# Patient Record
Sex: MALE | Race: WHITE | NOT HISPANIC OR LATINO | Employment: UNEMPLOYED | URBAN - METROPOLITAN AREA
[De-identification: names, ages, dates, MRNs, and addresses within clinical notes are randomized per-mention and may not be internally consistent; named-entity substitution may affect disease eponyms.]

---

## 2023-03-14 DIAGNOSIS — E78.5 HYPERLIPIDEMIA, UNSPECIFIED HYPERLIPIDEMIA TYPE: Primary | ICD-10-CM

## 2023-03-14 RX ORDER — ROSUVASTATIN CALCIUM 10 MG/1
10 TABLET, COATED ORAL DAILY
Qty: 90 TABLET | Refills: 0 | Status: SHIPPED | OUTPATIENT
Start: 2023-03-14 | End: 2023-06-02 | Stop reason: SDUPTHER

## 2023-03-14 RX ORDER — CYCLOBENZAPRINE HCL 10 MG
1 TABLET ORAL NIGHTLY
COMMUNITY
Start: 2022-07-09 | End: 2023-12-28 | Stop reason: WASHOUT

## 2023-03-14 RX ORDER — NABUMETONE 750 MG/1
1 TABLET, FILM COATED ORAL EVERY 12 HOURS
COMMUNITY
Start: 2022-07-09 | End: 2023-06-09 | Stop reason: SDUPTHER

## 2023-03-14 RX ORDER — IBUPROFEN 600 MG/1
1 TABLET ORAL 3 TIMES DAILY PRN
COMMUNITY
Start: 2022-07-09

## 2023-03-14 RX ORDER — ASPIRIN 81 MG/1
TABLET ORAL
COMMUNITY
End: 2023-12-28 | Stop reason: WASHOUT

## 2023-03-14 RX ORDER — ROSUVASTATIN CALCIUM 10 MG/1
1 TABLET, COATED ORAL DAILY
COMMUNITY
Start: 2013-09-17 | End: 2023-03-14 | Stop reason: SDUPTHER

## 2023-03-14 RX ORDER — METOPROLOL SUCCINATE 50 MG/1
1 TABLET, EXTENDED RELEASE ORAL DAILY
COMMUNITY
Start: 2021-02-23

## 2023-03-14 RX ORDER — ESCITALOPRAM OXALATE 20 MG/1
1 TABLET ORAL DAILY
COMMUNITY
Start: 2020-04-23 | End: 2023-03-27

## 2023-03-27 DIAGNOSIS — F32.9 MAJOR DEPRESSIVE DISORDER, SINGLE EPISODE, UNSPECIFIED: ICD-10-CM

## 2023-03-27 RX ORDER — ESCITALOPRAM OXALATE 20 MG/1
TABLET ORAL
Qty: 90 TABLET | Refills: 1 | Status: SHIPPED | OUTPATIENT
Start: 2023-03-27 | End: 2023-04-26 | Stop reason: SDUPTHER

## 2023-04-21 DIAGNOSIS — I10 ESSENTIAL (PRIMARY) HYPERTENSION: ICD-10-CM

## 2023-04-21 RX ORDER — METOPROLOL SUCCINATE 50 MG/1
TABLET, EXTENDED RELEASE ORAL
Qty: 90 TABLET | OUTPATIENT
Start: 2023-04-21

## 2023-04-26 DIAGNOSIS — F32.0 CURRENT MILD EPISODE OF MAJOR DEPRESSIVE DISORDER WITHOUT PRIOR EPISODE (CMS-HCC): Primary | ICD-10-CM

## 2023-04-26 DIAGNOSIS — F32.9 MAJOR DEPRESSIVE DISORDER, SINGLE EPISODE, UNSPECIFIED: ICD-10-CM

## 2023-04-26 RX ORDER — ESCITALOPRAM OXALATE 20 MG/1
20 TABLET ORAL DAILY
Qty: 30 TABLET | Refills: 0 | Status: SHIPPED | OUTPATIENT
Start: 2023-04-26 | End: 2023-05-03 | Stop reason: SDUPTHER

## 2023-05-03 ENCOUNTER — OFFICE VISIT (OUTPATIENT)
Dept: PRIMARY CARE | Facility: CLINIC | Age: 73
End: 2023-05-03
Payer: MEDICARE

## 2023-05-03 VITALS
WEIGHT: 199 LBS | DIASTOLIC BLOOD PRESSURE: 88 MMHG | SYSTOLIC BLOOD PRESSURE: 146 MMHG | BODY MASS INDEX: 29.47 KG/M2 | HEIGHT: 69 IN

## 2023-05-03 DIAGNOSIS — F32.9 MAJOR DEPRESSIVE DISORDER, SINGLE EPISODE, UNSPECIFIED: ICD-10-CM

## 2023-05-03 DIAGNOSIS — E78.00 LOW-DENSITY-LIPOID-TYPE (LDL) HYPERLIPOPROTEINEMIA: ICD-10-CM

## 2023-05-03 DIAGNOSIS — I10 PRIMARY HYPERTENSION: ICD-10-CM

## 2023-05-03 DIAGNOSIS — F20.89 OTHER SCHIZOPHRENIA (MULTI): ICD-10-CM

## 2023-05-03 DIAGNOSIS — F32.0 CURRENT MILD EPISODE OF MAJOR DEPRESSIVE DISORDER WITHOUT PRIOR EPISODE (CMS-HCC): ICD-10-CM

## 2023-05-03 PROBLEM — I95.1 ORTHOSTATIC HYPOTENSION: Status: ACTIVE | Noted: 2023-05-03

## 2023-05-03 PROBLEM — H02.889 MEIBOMIAN GLAND DYSFUNCTION (MGD): Status: ACTIVE | Noted: 2023-05-03

## 2023-05-03 PROBLEM — R41.3 MEMORY LOSS: Status: ACTIVE | Noted: 2023-05-03

## 2023-05-03 PROBLEM — H11.829 CONJUNCTIVAL CHALASIS: Status: ACTIVE | Noted: 2023-05-03

## 2023-05-03 PROBLEM — R42 DIZZINESS: Status: ACTIVE | Noted: 2023-05-03

## 2023-05-03 PROBLEM — H04.123 DRY EYE SYNDROME OF BOTH LACRIMAL GLANDS: Status: ACTIVE | Noted: 2023-05-03

## 2023-05-03 PROBLEM — H02.836 DERMATOCHALASIS OF EYELID OF LEFT EYE: Status: ACTIVE | Noted: 2023-05-03

## 2023-05-03 PROBLEM — H10.13 ACUTE ALLERGIC CONJUNCTIVITIS OF BOTH EYES: Status: ACTIVE | Noted: 2023-05-03

## 2023-05-03 PROBLEM — M54.2 NECK PAIN: Status: ACTIVE | Noted: 2023-05-03

## 2023-05-03 PROBLEM — R09.89 CAROTID BRUIT: Status: ACTIVE | Noted: 2023-05-03

## 2023-05-03 PROBLEM — G31.84 MINIMAL COGNITIVE IMPAIRMENT: Status: ACTIVE | Noted: 2023-05-03

## 2023-05-03 PROBLEM — F32.A DEPRESSION: Status: ACTIVE | Noted: 2023-05-03

## 2023-05-03 PROBLEM — H81.09 MENIERE'S DISEASE: Status: ACTIVE | Noted: 2023-05-03

## 2023-05-03 PROBLEM — J01.90 ACUTE SINUSITIS: Status: ACTIVE | Noted: 2023-05-03

## 2023-05-03 PROBLEM — M48.00 SPINAL STENOSIS: Status: ACTIVE | Noted: 2023-05-03

## 2023-05-03 PROCEDURE — 1159F MED LIST DOCD IN RCRD: CPT | Performed by: INTERNAL MEDICINE

## 2023-05-03 PROCEDURE — 3079F DIAST BP 80-89 MM HG: CPT | Performed by: INTERNAL MEDICINE

## 2023-05-03 PROCEDURE — 99214 OFFICE O/P EST MOD 30 MIN: CPT | Performed by: INTERNAL MEDICINE

## 2023-05-03 PROCEDURE — 3077F SYST BP >= 140 MM HG: CPT | Performed by: INTERNAL MEDICINE

## 2023-05-03 RX ORDER — HALOPERIDOL 0.5 MG/1
0.5 TABLET ORAL 2 TIMES DAILY
Qty: 60 TABLET | Refills: 0 | Status: SHIPPED | OUTPATIENT
Start: 2023-05-03 | End: 2023-06-02 | Stop reason: SDUPTHER

## 2023-05-03 RX ORDER — ESCITALOPRAM OXALATE 20 MG/1
20 TABLET ORAL DAILY
Qty: 90 TABLET | Refills: 1 | Status: SHIPPED | OUTPATIENT
Start: 2023-05-03 | End: 2023-10-31

## 2023-05-03 NOTE — PROGRESS NOTES
"OFFICE NOTE    NAME OF THE PATIENT: Mio Tirado    YOB: 1950    CHIEF COMPLAINT:  Mio Tirado today came here for multiple medical issues.  He told me that he has sundowning.  He wakes up at night.  It looks like the commentators are speaking, joking around and he hears these voices, they do not exist.  He saw ENT doctor.  ENT doctor says he is hallucinating.  He is at last to find why he is hallucinating.  Appetite and weight are okay.  No problem.    PAST MEDICAL HISTORY:  Reviewed on EMR, unchanged.    CURRENT MEDICATIONS:  Reviewed on EMR, unchanged.  List reviewed.    ALLERGIES:  Reviewed on EMR, unchanged.    SOCIAL HISTORY:  Reviewed on EMR, unchanged.  He does not smoke, does not drink alcohol.    FAMILY HISTORY:  Reviewed on EMR, unchanged.    REVIEW OF SYSTEMS:  All 12 systems reviewed and pertaining covered in history and physical.    PHYSICAL EXAMINATION  VITAL SIGNS:  As recorded and reviewed from EMR.  RESPIRATORY:  The patient had normal inspirations and expirations.  The breath sounds were equal bilaterally and clear to auscultation.  CARDIOVASCULAR:  The patient had S1 normal, split S2 without obvious rubs, clicks, or murmurs.    GASTROINTESTINAL:  There was no hepatosplenomegaly.  There were no palpable masses and no inguinal nodes.  EXTREMITIES:  Legs had no edema.  NEUROLOGIC:  The patient had normal cranial nerves.  The reflexes, sensory, and motor examination were grossly within normal limits.    LAB WORK:  Laboratory testing discussed.    ASSESSMENT AND PLAN:  Hypertension, okay.  Cholesterol.  Monitor.  Anxiety and stress.  Lexapro given.  Blood work ordered.  Sundowning.  I am going to try some Haldol.  I shall see him back in about two week's time.    Kindly review this note in conjunction with EMR.     Subjective   Patient ID: Mio Tirado is a 72 y.o. male who presents for Follow-up.      HPI    Review of Systems    Objective   /88   Ht 1.753 m (5' 9\")   Wt " 90.3 kg (199 lb)   BMI 29.39 kg/m²       Physical Exam    Assessment/Plan   Problem List Items Addressed This Visit          Other    Depression     Other Visit Diagnoses       Major depressive disorder, single episode, unspecified

## 2023-05-11 ENCOUNTER — TELEPHONE (OUTPATIENT)
Dept: PRIMARY CARE | Facility: CLINIC | Age: 73
End: 2023-05-11
Payer: MEDICARE

## 2023-05-11 NOTE — TELEPHONE ENCOUNTER
----- Message from Ly Vann MA sent at 5/10/2023  4:52 PM EDT -----  Pt lm wanted lexapro called into pharmacy but pt states pharmacy will not fill it until the end of may and pt sts he needs it now bc he is out

## 2023-06-02 DIAGNOSIS — I10 PRIMARY HYPERTENSION: ICD-10-CM

## 2023-06-02 DIAGNOSIS — M54.9 BACK PAIN, UNSPECIFIED BACK LOCATION, UNSPECIFIED BACK PAIN LATERALITY, UNSPECIFIED CHRONICITY: ICD-10-CM

## 2023-06-02 DIAGNOSIS — F20.89 OTHER SCHIZOPHRENIA (MULTI): ICD-10-CM

## 2023-06-02 DIAGNOSIS — E78.5 HYPERLIPIDEMIA, UNSPECIFIED HYPERLIPIDEMIA TYPE: ICD-10-CM

## 2023-06-02 PROBLEM — H02.833 DERMATOCHALASIS OF EYELID OF RIGHT EYE: Status: ACTIVE | Noted: 2023-06-02

## 2023-06-02 PROBLEM — H10.13 ACUTE ALLERGIC CONJUNCTIVITIS, BILATERAL: Status: ACTIVE | Noted: 2023-06-02

## 2023-06-02 RX ORDER — HALOPERIDOL 0.5 MG/1
0.5 TABLET ORAL 2 TIMES DAILY
Qty: 180 TABLET | Refills: 1 | Status: SHIPPED | OUTPATIENT
Start: 2023-06-02 | End: 2023-07-06 | Stop reason: ALTCHOICE

## 2023-06-02 RX ORDER — ROSUVASTATIN CALCIUM 10 MG/1
10 TABLET, COATED ORAL DAILY
Qty: 90 TABLET | Refills: 0 | Status: SHIPPED | OUTPATIENT
Start: 2023-06-02 | End: 2023-08-24

## 2023-06-08 ENCOUNTER — OFFICE VISIT (OUTPATIENT)
Dept: PRIMARY CARE | Facility: CLINIC | Age: 73
End: 2023-06-08
Payer: MEDICARE

## 2023-06-08 ENCOUNTER — LAB (OUTPATIENT)
Dept: LAB | Facility: LAB | Age: 73
End: 2023-06-08
Payer: MEDICARE

## 2023-06-08 VITALS
BODY MASS INDEX: 29.03 KG/M2 | DIASTOLIC BLOOD PRESSURE: 78 MMHG | SYSTOLIC BLOOD PRESSURE: 126 MMHG | HEIGHT: 69 IN | WEIGHT: 196 LBS

## 2023-06-08 DIAGNOSIS — E55.9 VITAMIN D DEFICIENCY: ICD-10-CM

## 2023-06-08 DIAGNOSIS — E78.00 LOW-DENSITY-LIPOID-TYPE (LDL) HYPERLIPOPROTEINEMIA: ICD-10-CM

## 2023-06-08 DIAGNOSIS — F23 BRIEF PSYCHOTIC DISORDER (MULTI): ICD-10-CM

## 2023-06-08 DIAGNOSIS — I10 HYPERTENSION, UNSPECIFIED TYPE: ICD-10-CM

## 2023-06-08 DIAGNOSIS — E53.9 VITAMIN B DEFICIENCY: ICD-10-CM

## 2023-06-08 LAB
APPEARANCE, URINE: NORMAL
BILIRUBIN, URINE: NEGATIVE
BLOOD, URINE: NEGATIVE
CALCIDIOL (25 OH VITAMIN D3) (NG/ML) IN SER/PLAS: 41 NG/ML
COBALAMIN (VITAMIN B12) (PG/ML) IN SER/PLAS: 603 PG/ML (ref 211–911)
COLOR, URINE: YELLOW
GLUCOSE, URINE: NEGATIVE MG/DL
KETONES, URINE: NEGATIVE MG/DL
LEUKOCYTE ESTERASE, URINE: NEGATIVE
NITRITE, URINE: NEGATIVE
PH, URINE: 5 (ref 5–8)
PROTEIN, URINE: NEGATIVE MG/DL
SPECIFIC GRAVITY, URINE: 1.02 (ref 1–1.03)
THYROTROPIN (MIU/L) IN SER/PLAS BY DETECTION LIMIT <= 0.05 MIU/L: 1.56 MIU/L (ref 0.44–3.98)
UROBILINOGEN, URINE: <2 MG/DL (ref 0–1.9)

## 2023-06-08 PROCEDURE — 82607 VITAMIN B-12: CPT

## 2023-06-08 PROCEDURE — 3074F SYST BP LT 130 MM HG: CPT | Performed by: INTERNAL MEDICINE

## 2023-06-08 PROCEDURE — 85025 COMPLETE CBC W/AUTO DIFF WBC: CPT

## 2023-06-08 PROCEDURE — 80053 COMPREHEN METABOLIC PANEL: CPT

## 2023-06-08 PROCEDURE — 81003 URINALYSIS AUTO W/O SCOPE: CPT

## 2023-06-08 PROCEDURE — 84443 ASSAY THYROID STIM HORMONE: CPT

## 2023-06-08 PROCEDURE — 1159F MED LIST DOCD IN RCRD: CPT | Performed by: INTERNAL MEDICINE

## 2023-06-08 PROCEDURE — 36415 COLL VENOUS BLD VENIPUNCTURE: CPT

## 2023-06-08 PROCEDURE — 99213 OFFICE O/P EST LOW 20 MIN: CPT | Performed by: INTERNAL MEDICINE

## 2023-06-08 PROCEDURE — 82306 VITAMIN D 25 HYDROXY: CPT

## 2023-06-08 PROCEDURE — 3078F DIAST BP <80 MM HG: CPT | Performed by: INTERNAL MEDICINE

## 2023-06-08 PROCEDURE — 80061 LIPID PANEL: CPT

## 2023-06-08 RX ORDER — HALOPERIDOL 0.5 MG/1
0.5 TABLET ORAL 2 TIMES DAILY
Qty: 180 TABLET | Refills: 1 | Status: SHIPPED | OUTPATIENT
Start: 2023-06-08 | End: 2024-03-21

## 2023-06-08 NOTE — PROGRESS NOTES
"OFFICE NOTE    NAME OF THE PATIENT: Mio Tirado    YOB: 1950    CHIEF COMPLAINT:  Mr. Tirado today came here for multiple medical issues.  He is feeling somewhat tired, fatigued, exhausted all the time.  The Haldol did help his hallucination.  Appetite and weight are okay.  No problem.    PAST MEDICAL HISTORY:  Reviewed on EMR, unchanged.    CURRENT MEDICATIONS:  Reviewed on EMR, unchanged.  List reviewed.    ALLERGIES:  Reviewed on EMR, unchanged.    SOCIAL HISTORY:  Reviewed on EMR, unchanged.  He does not smoke.    FAMILY HISTORY:  Reviewed on EMR, unchanged.    REVIEW OF SYSTEMS:  All 12 systems reviewed and pertaining covered in history and physical.    PHYSICAL EXAMINATION  VITAL SIGNS:  As recorded and reviewed from EMR.  RESPIRATORY:  The patient had normal inspirations and expirations.  The breath sounds were equal bilaterally and clear to auscultation.  CARDIOVASCULAR:  The patient had S1 normal, split S2 without obvious rubs, clicks, or murmurs.    GASTROINTESTINAL:  There was no hepatosplenomegaly.  There were no palpable masses and no inguinal nodes.  EXTREMITIES:  Legs had no edema.  NEUROLOGIC:  The patient had normal cranial nerves.  The reflexes, sensory, and motor examination were grossly within normal limits.    LAB WORK:  Laboratory testing discussed.    ASSESSMENT AND PLAN:  Psychosis and sundowning issue.  We will give Haldol as needed.  Anxiety and stress.  I am going to move Lexapro to evening and see what happens.  Hypertension, okay.  High cholesterol, stable.  Thyroid, okay.  Sleep apnea.  He is using machine.  He is happy.  Follow-up appointment with me in about three weeks to review situation.  I urged him to do the blood work.    Kindly review this note in conjunction with EMR.     Subjective   Patient ID: Mio Tirado is a 73 y.o. male who presents for Follow-up.      HPI    Review of Systems    Objective   /78   Ht 1.753 m (5' 9\")   Wt 88.9 kg (196 lb)  "  BMI 28.94 kg/m²       Physical Exam    Assessment/Plan   Problem List Items Addressed This Visit    None

## 2023-06-09 LAB
ALANINE AMINOTRANSFERASE (SGPT) (U/L) IN SER/PLAS: 14 U/L (ref 10–52)
ALBUMIN (G/DL) IN SER/PLAS: 4.3 G/DL (ref 3.4–5)
ALKALINE PHOSPHATASE (U/L) IN SER/PLAS: 47 U/L (ref 33–136)
ANION GAP IN SER/PLAS: 15 MMOL/L (ref 10–20)
ASPARTATE AMINOTRANSFERASE (SGOT) (U/L) IN SER/PLAS: 14 U/L (ref 9–39)
BASOPHILS (10*3/UL) IN BLOOD BY AUTOMATED COUNT: 0.12 X10E9/L (ref 0–0.1)
BASOPHILS/100 LEUKOCYTES IN BLOOD BY AUTOMATED COUNT: 1.5 % (ref 0–2)
BILIRUBIN TOTAL (MG/DL) IN SER/PLAS: 1.2 MG/DL (ref 0–1.2)
CALCIUM (MG/DL) IN SER/PLAS: 10.1 MG/DL (ref 8.6–10.6)
CARBON DIOXIDE, TOTAL (MMOL/L) IN SER/PLAS: 27 MMOL/L (ref 21–32)
CHLORIDE (MMOL/L) IN SER/PLAS: 103 MMOL/L (ref 98–107)
CHOLESTEROL (MG/DL) IN SER/PLAS: 184 MG/DL (ref 0–199)
CHOLESTEROL IN HDL (MG/DL) IN SER/PLAS: 44.9 MG/DL
CHOLESTEROL/HDL RATIO: 4.1
CREATININE (MG/DL) IN SER/PLAS: 0.99 MG/DL (ref 0.5–1.3)
EOSINOPHILS (10*3/UL) IN BLOOD BY AUTOMATED COUNT: 0.78 X10E9/L (ref 0–0.4)
EOSINOPHILS/100 LEUKOCYTES IN BLOOD BY AUTOMATED COUNT: 9.4 % (ref 0–6)
ERYTHROCYTE DISTRIBUTION WIDTH (RATIO) BY AUTOMATED COUNT: 12.2 % (ref 11.5–14.5)
ERYTHROCYTE MEAN CORPUSCULAR HEMOGLOBIN CONCENTRATION (G/DL) BY AUTOMATED: 33.1 G/DL (ref 32–36)
ERYTHROCYTE MEAN CORPUSCULAR VOLUME (FL) BY AUTOMATED COUNT: 92 FL (ref 80–100)
ERYTHROCYTES (10*6/UL) IN BLOOD BY AUTOMATED COUNT: 5.54 X10E12/L (ref 4.5–5.9)
GFR MALE: 79 ML/MIN/1.73M2
GLUCOSE (MG/DL) IN SER/PLAS: 93 MG/DL (ref 74–99)
HEMATOCRIT (%) IN BLOOD BY AUTOMATED COUNT: 51.1 % (ref 41–52)
HEMOGLOBIN (G/DL) IN BLOOD: 16.9 G/DL (ref 13.5–17.5)
IMMATURE GRANULOCYTES/100 LEUKOCYTES IN BLOOD BY AUTOMATED COUNT: 0.2 % (ref 0–0.9)
LDL: 88 MG/DL (ref 0–99)
LEUKOCYTES (10*3/UL) IN BLOOD BY AUTOMATED COUNT: 8.3 X10E9/L (ref 4.4–11.3)
LYMPHOCYTES (10*3/UL) IN BLOOD BY AUTOMATED COUNT: 2.01 X10E9/L (ref 0.8–3)
LYMPHOCYTES/100 LEUKOCYTES IN BLOOD BY AUTOMATED COUNT: 24.3 % (ref 13–44)
MONOCYTES (10*3/UL) IN BLOOD BY AUTOMATED COUNT: 0.55 X10E9/L (ref 0.05–0.8)
MONOCYTES/100 LEUKOCYTES IN BLOOD BY AUTOMATED COUNT: 6.7 % (ref 2–10)
NEUTROPHILS (10*3/UL) IN BLOOD BY AUTOMATED COUNT: 4.78 X10E9/L (ref 1.6–5.5)
NEUTROPHILS/100 LEUKOCYTES IN BLOOD BY AUTOMATED COUNT: 57.9 % (ref 40–80)
NON HDL CHOLESTEROL: 139 MG/DL
NRBC (PER 100 WBCS) BY AUTOMATED COUNT: 0 /100 WBC (ref 0–0)
PLATELETS (10*3/UL) IN BLOOD AUTOMATED COUNT: 216 X10E9/L (ref 150–450)
POTASSIUM (MMOL/L) IN SER/PLAS: 3.7 MMOL/L (ref 3.5–5.3)
PROTEIN TOTAL: 6.4 G/DL (ref 6.4–8.2)
SODIUM (MMOL/L) IN SER/PLAS: 141 MMOL/L (ref 136–145)
TRIGLYCERIDE (MG/DL) IN SER/PLAS: 255 MG/DL (ref 0–149)
UREA NITROGEN (MG/DL) IN SER/PLAS: 19 MG/DL (ref 6–23)
VLDL: 51 MG/DL (ref 0–40)

## 2023-06-09 RX ORDER — NABUMETONE 750 MG/1
750 TABLET, FILM COATED ORAL EVERY 12 HOURS
Qty: 60 TABLET | Refills: 3 | Status: SHIPPED | OUTPATIENT
Start: 2023-06-09 | End: 2023-10-31

## 2023-06-15 ENCOUNTER — TELEPHONE (OUTPATIENT)
Dept: PRIMARY CARE | Facility: CLINIC | Age: 73
End: 2023-06-15
Payer: MEDICARE

## 2023-06-21 DIAGNOSIS — E78.5 HYPERLIPIDEMIA, UNSPECIFIED HYPERLIPIDEMIA TYPE: ICD-10-CM

## 2023-07-06 ENCOUNTER — OFFICE VISIT (OUTPATIENT)
Dept: PRIMARY CARE | Facility: CLINIC | Age: 73
End: 2023-07-06
Payer: MEDICARE

## 2023-07-06 VITALS — WEIGHT: 199 LBS | HEIGHT: 69 IN | BODY MASS INDEX: 29.47 KG/M2

## 2023-07-06 DIAGNOSIS — I10 PRIMARY HYPERTENSION: Primary | ICD-10-CM

## 2023-07-06 PROCEDURE — 1159F MED LIST DOCD IN RCRD: CPT | Performed by: INTERNAL MEDICINE

## 2023-07-06 PROCEDURE — 99213 OFFICE O/P EST LOW 20 MIN: CPT | Performed by: INTERNAL MEDICINE

## 2023-07-06 NOTE — PROGRESS NOTES
"OFFICE NOTE    NAME OF THE PATIENT: Mio Tirado    YOB: 1950    CHIEF COMPLAINT:  This gentleman today came here for multiple medical issues.  Haldol does help him, but not completely.  He is hearing voices.  Feeling tired, fatigued, exhausted.  He came for follow-up on blood work.  Appetite and weight are okay.  No problem.    PAST MEDICAL HISTORY:  Reviewed on EMR, unchanged.    CURRENT MEDICATIONS:  Reviewed on EMR, unchanged.    ALLERGIES:  Reviewed on EMR, unchanged.    SOCIAL HISTORY:  Reviewed on EMR, unchanged.    FAMILY HISTORY:  Reviewed on EMR, unchanged.    REVIEW OF SYSTEMS:  All 12 systems reviewed and pertaining covered in history and physical.    PHYSICAL EXAMINATION  VITAL SIGNS:  As recorded and reviewed from EMR.  RESPIRATORY:  The patient had normal inspirations and expirations.  The breath sounds were equal bilaterally and clear to auscultation.  CARDIOVASCULAR:  The patient had S1 normal, split S2 without obvious rubs, clicks, or murmurs.    GASTROINTESTINAL:  There was no hepatosplenomegaly.  There were no palpable masses and no inguinal nodes.  EXTREMITIES:  Legs had no edema.  NEUROLOGIC:  The patient had normal cranial nerves.  The reflexes, sensory, and motor examination were grossly within normal limits.    LAB WORK:  Laboratory testing discussed.    ASSESSMENT AND PLAN:  Hypertension, okay.  Cholesterol, stable.  GERD, on PPI.  Anxiety, stress, sundowning.  Haldol is not completely helping.  We discussed Rexulti.  We will start with low dose.  See him back in three weeks.  Continue to follow.  Vitamin D improved.  Monitor.    Kindly review this note in conjunction with EMR.   Subjective   Patient ID: Mio Tirado is a 73 y.o. male who presents for Follow-up and Med Refill.      HPI    Review of Systems    Objective   Ht 1.753 m (5' 9\")   Wt 90.3 kg (199 lb)   BMI 29.39 kg/m²       Physical Exam    Assessment/Plan   Problem List Items Addressed This Visit  "   None

## 2023-07-20 ENCOUNTER — OFFICE VISIT (OUTPATIENT)
Dept: PRIMARY CARE | Facility: CLINIC | Age: 73
End: 2023-07-20
Payer: MEDICARE

## 2023-07-20 ENCOUNTER — LAB (OUTPATIENT)
Dept: LAB | Facility: LAB | Age: 73
End: 2023-07-20
Payer: MEDICARE

## 2023-07-20 VITALS
DIASTOLIC BLOOD PRESSURE: 88 MMHG | WEIGHT: 198 LBS | SYSTOLIC BLOOD PRESSURE: 144 MMHG | HEIGHT: 69 IN | BODY MASS INDEX: 29.33 KG/M2

## 2023-07-20 DIAGNOSIS — R07.9 CHEST PAIN, UNSPECIFIED TYPE: ICD-10-CM

## 2023-07-20 DIAGNOSIS — K82.9 GALLBLADDER PAIN: ICD-10-CM

## 2023-07-20 LAB — TROPONIN I, HIGH SENSITIVITY: 47 NG/L (ref 0–53)

## 2023-07-20 PROCEDURE — 93000 ELECTROCARDIOGRAM COMPLETE: CPT | Performed by: INTERNAL MEDICINE

## 2023-07-20 PROCEDURE — 1159F MED LIST DOCD IN RCRD: CPT | Performed by: INTERNAL MEDICINE

## 2023-07-20 PROCEDURE — 99214 OFFICE O/P EST MOD 30 MIN: CPT | Performed by: INTERNAL MEDICINE

## 2023-07-20 PROCEDURE — 3079F DIAST BP 80-89 MM HG: CPT | Performed by: INTERNAL MEDICINE

## 2023-07-20 PROCEDURE — 3077F SYST BP >= 140 MM HG: CPT | Performed by: INTERNAL MEDICINE

## 2023-07-20 PROCEDURE — 84484 ASSAY OF TROPONIN QUANT: CPT

## 2023-07-20 PROCEDURE — 36415 COLL VENOUS BLD VENIPUNCTURE: CPT

## 2023-07-20 RX ORDER — OMEPRAZOLE 40 MG/1
40 CAPSULE, DELAYED RELEASE ORAL
Qty: 30 CAPSULE | Refills: 3 | Status: SHIPPED | OUTPATIENT
Start: 2023-07-20 | End: 2023-08-15

## 2023-07-20 NOTE — PROGRESS NOTES
"OFFICE NOTE    NAME OF THE PATIENT: Mio Tirado    YOB: 1950    CHIEF COMPLAINT:  Mio Tirado today came here for multiple medical issues.  He has chest pain, dyspnea on exertion, not feeling well.  Easy fatigability.  He came here for follow-up on various conditions.  It happened all suddenly yesterday.  He had a stress test done in 2020.    PAST MEDICAL HISTORY:  Reviewed on EMR, unchanged.    CURRENT MEDICATIONS:  Reviewed on EMR, unchanged.    ALLERGIES:  Reviewed on EMR, unchanged.    SOCIAL HISTORY:  Reviewed on EMR, unchanged.    FAMILY HISTORY:  Reviewed on EMR, unchanged.    REVIEW OF SYSTEMS:  All 12 systems reviewed and pertaining covered in history and physical.    PHYSICAL EXAMINATION  VITAL SIGNS:  As recorded and reviewed from EMR.  RESPIRATORY:  The patient had normal inspirations and expirations.  The breath sounds were equal bilaterally and clear to auscultation.  CARDIOVASCULAR:  The patient had S1 normal, split S2 without obvious rubs, clicks, or murmurs.    GASTROINTESTINAL:  There was no hepatosplenomegaly.  There were no palpable masses and no inguinal nodes.  EXTREMITIES:  Legs had no edema.  NEUROLOGIC:  The patient had normal cranial nerves.  The reflexes, sensory, and motor examination were grossly within normal limits.    LAB WORK:  Laboratory testing discussed.    ASSESSMENT AND PLAN:  Chest pain rule out MI.  EKG done, comparable non-specific.  Chest x-ray ______ report pending.  Gall bladder ultrasound, probably gallbladder issue.  I ordered ultrasound.  GERD.  PPI given.  Cardiac.  Refer to Dr. Montes.  I saw him before and after x-ray and EKG.  Blood work reviewed.  I shall see him back in four weeks.  Continue to follow.      Kindly review this note in conjunction with EMR.   Subjective   Patient ID: Adithya Tirado is a 73 y.o. male who presents for Follow-up.      HPI    Review of Systems    Objective   /88   Ht 1.753 m (5' 9\")   Wt 89.8 kg (198 lb)   " BMI 29.24 kg/m²       Physical Exam    Assessment/Plan   Problem List Items Addressed This Visit    None

## 2023-07-26 ENCOUNTER — TELEPHONE (OUTPATIENT)
Dept: PRIMARY CARE | Facility: CLINIC | Age: 73
End: 2023-07-26
Payer: MEDICARE

## 2023-08-15 DIAGNOSIS — R07.9 CHEST PAIN, UNSPECIFIED TYPE: ICD-10-CM

## 2023-08-15 RX ORDER — OMEPRAZOLE 40 MG/1
40 CAPSULE, DELAYED RELEASE ORAL
Qty: 30 CAPSULE | Refills: 3 | Status: SHIPPED | OUTPATIENT
Start: 2023-08-15 | End: 2023-11-13

## 2023-08-24 DIAGNOSIS — E78.5 HYPERLIPIDEMIA, UNSPECIFIED HYPERLIPIDEMIA TYPE: ICD-10-CM

## 2023-08-24 RX ORDER — ROSUVASTATIN CALCIUM 10 MG/1
10 TABLET, COATED ORAL DAILY
Qty: 90 TABLET | Refills: 0 | Status: SHIPPED | OUTPATIENT
Start: 2023-08-24

## 2023-10-31 DIAGNOSIS — H10.13 ACUTE ATOPIC CONJUNCTIVITIS, BILATERAL: ICD-10-CM

## 2023-10-31 RX ORDER — FLUOROMETHOLONE 1 MG/ML
SUSPENSION/ DROPS OPHTHALMIC
Qty: 5 ML | Refills: 3 | Status: SHIPPED | OUTPATIENT
Start: 2023-10-31 | End: 2023-11-01

## 2023-11-13 DIAGNOSIS — R07.9 CHEST PAIN, UNSPECIFIED TYPE: ICD-10-CM

## 2023-11-13 RX ORDER — OMEPRAZOLE 40 MG/1
40 CAPSULE, DELAYED RELEASE ORAL
Qty: 90 CAPSULE | Refills: 1 | Status: SHIPPED | OUTPATIENT
Start: 2023-11-13 | End: 2024-05-10

## 2023-12-04 ENCOUNTER — OFFICE VISIT (OUTPATIENT)
Dept: PRIMARY CARE | Facility: CLINIC | Age: 73
End: 2023-12-04
Payer: MEDICARE

## 2023-12-04 VITALS
WEIGHT: 200 LBS | BODY MASS INDEX: 29.62 KG/M2 | HEIGHT: 69 IN | SYSTOLIC BLOOD PRESSURE: 128 MMHG | DIASTOLIC BLOOD PRESSURE: 78 MMHG

## 2023-12-04 DIAGNOSIS — E78.00 LOW-DENSITY-LIPOID-TYPE (LDL) HYPERLIPOPROTEINEMIA: ICD-10-CM

## 2023-12-04 DIAGNOSIS — Z12.5 ENCOUNTER FOR PROSTATE CANCER SCREENING: ICD-10-CM

## 2023-12-04 DIAGNOSIS — I10 HYPERTENSION, UNSPECIFIED TYPE: ICD-10-CM

## 2023-12-04 DIAGNOSIS — M19.90 ARTHRITIS: Primary | ICD-10-CM

## 2023-12-04 DIAGNOSIS — E03.9 HYPOTHYROIDISM, UNSPECIFIED TYPE: ICD-10-CM

## 2023-12-04 DIAGNOSIS — M54.9 BACK PAIN, UNSPECIFIED BACK LOCATION, UNSPECIFIED BACK PAIN LATERALITY, UNSPECIFIED CHRONICITY: ICD-10-CM

## 2023-12-04 PROCEDURE — 1126F AMNT PAIN NOTED NONE PRSNT: CPT | Performed by: INTERNAL MEDICINE

## 2023-12-04 PROCEDURE — 1159F MED LIST DOCD IN RCRD: CPT | Performed by: INTERNAL MEDICINE

## 2023-12-04 PROCEDURE — 3078F DIAST BP <80 MM HG: CPT | Performed by: INTERNAL MEDICINE

## 2023-12-04 PROCEDURE — 99213 OFFICE O/P EST LOW 20 MIN: CPT | Performed by: INTERNAL MEDICINE

## 2023-12-04 PROCEDURE — 3074F SYST BP LT 130 MM HG: CPT | Performed by: INTERNAL MEDICINE

## 2023-12-04 RX ORDER — NABUMETONE 750 MG/1
750 TABLET, FILM COATED ORAL EVERY 12 HOURS
Qty: 60 TABLET | Refills: 0 | Status: SHIPPED | OUTPATIENT
Start: 2023-12-04 | End: 2024-01-31 | Stop reason: SDUPTHER

## 2023-12-04 ASSESSMENT — ENCOUNTER SYMPTOMS
OCCASIONAL FEELINGS OF UNSTEADINESS: 0
DEPRESSION: 0
LOSS OF SENSATION IN FEET: 0

## 2023-12-05 ENCOUNTER — ANCILLARY PROCEDURE (OUTPATIENT)
Dept: RADIOLOGY | Facility: CLINIC | Age: 73
End: 2023-12-05
Payer: MEDICARE

## 2023-12-05 DIAGNOSIS — R07.9 CHEST PAIN, UNSPECIFIED: ICD-10-CM

## 2023-12-05 DIAGNOSIS — I10 ESSENTIAL (PRIMARY) HYPERTENSION: ICD-10-CM

## 2023-12-05 DIAGNOSIS — E78.00 PURE HYPERCHOLESTEROLEMIA, UNSPECIFIED: ICD-10-CM

## 2023-12-05 PROCEDURE — 75571 CT HRT W/O DYE W/CA TEST: CPT

## 2023-12-05 NOTE — PROGRESS NOTES
"Subjective   Patient ID: Adithya Tirado is a 73 y.o. male who presents for Follow-up (various conditions) and multiple medical issues.    Adithya Tirado today came here for multiple medical issues.  1. He has arthritis, bone and joint pain.  2. He told me that he has new sleep apnea machine.  It is working very well.  He is happy with that.  Appetite and weight are okay.  No problem.  He came for follow-up on various conditions.    I have personally reviewed the patient's Past Medical History, Medications, Allergies, Social History, and Family History in the EMR.    Review of Systems   All other systems reviewed and are negative.    Objective   /78   Ht 1.753 m (5' 9\")   Wt 90.7 kg (200 lb)   BMI 29.53 kg/m²     Physical Exam  Vitals reviewed.   Cardiovascular:      Heart sounds: Normal heart sounds, S1 normal and S2 normal. No murmur heard.     No friction rub.   Pulmonary:      Effort: Pulmonary effort is normal.      Breath sounds: Normal breath sounds and air entry.   Abdominal:      Palpations: There is no hepatomegaly, splenomegaly or mass.   Musculoskeletal:      Right lower leg: No edema.      Left lower leg: No edema.   Lymphadenopathy:      Lower Body: No right inguinal adenopathy. No left inguinal adenopathy.   Neurological:      Cranial Nerves: Cranial nerves 2-12 are intact.      Sensory: No sensory deficit.      Motor: Motor function is intact.      Deep Tendon Reflexes: Reflexes are normal and symmetric.     LAB WORK: Laboratory testing discussed.    Assessment/Plan   Problem List Items Addressed This Visit             ICD-10-CM       Cardiac and Vasculature    Hypertension I10    Relevant Orders    Thyroid Stimulating Hormone    Low-density-lipoid-type (LDL) hyperlipoproteinemia E78.00    Relevant Orders    Lipid Panel     Other Visit Diagnoses         Codes    Arthritis    -  Primary M19.90    Back pain, unspecified back location, unspecified back pain laterality, unspecified chronicity     " M54.9    Relevant Medications    nabumetone (Relafen) 750 mg tablet    Other Relevant Orders    CBC and Auto Differential    Comprehensive Metabolic Panel    Urinalysis with Reflex Microscopic    Encounter for prostate cancer screening     Z12.5    Relevant Orders    Prostate Specific Antigen    Hypothyroidism, unspecified type     E03.9        1. Arthritis.  ______.  2. Hypertension, okay.  3. Cholesterol, stable.  4. Thyroid, okay.  5. Prostate health.  PSA.  6. I urged him to do blood work and see me in a week after tests for follow-up.    Scribe Attestation  By signing my name below, I, Buddy Jones attest that this documentation has been prepared under the direction and in the presence of Mayela Barrett MD.

## 2023-12-15 ENCOUNTER — APPOINTMENT (OUTPATIENT)
Dept: RADIOLOGY | Facility: HOSPITAL | Age: 73
End: 2023-12-15
Payer: MEDICARE

## 2023-12-15 ENCOUNTER — HOSPITAL ENCOUNTER (EMERGENCY)
Facility: HOSPITAL | Age: 73
Discharge: HOME | End: 2023-12-16
Attending: GENERAL PRACTICE
Payer: MEDICARE

## 2023-12-15 ENCOUNTER — APPOINTMENT (OUTPATIENT)
Dept: CARDIOLOGY | Facility: HOSPITAL | Age: 73
End: 2023-12-15
Payer: MEDICARE

## 2023-12-15 DIAGNOSIS — R55 SYNCOPE AND COLLAPSE: ICD-10-CM

## 2023-12-15 DIAGNOSIS — N17.9 AKI (ACUTE KIDNEY INJURY) (CMS-HCC): Primary | ICD-10-CM

## 2023-12-15 LAB
ALBUMIN SERPL BCP-MCNC: 3.9 G/DL (ref 3.4–5)
ALP SERPL-CCNC: 32 U/L (ref 33–136)
ALT SERPL W P-5'-P-CCNC: 13 U/L (ref 10–52)
ANION GAP SERPL CALC-SCNC: 14 MMOL/L (ref 10–20)
AST SERPL W P-5'-P-CCNC: 32 U/L (ref 9–39)
BASOPHILS # BLD AUTO: 0.12 X10*3/UL (ref 0–0.1)
BASOPHILS NFR BLD AUTO: 1.3 %
BILIRUB SERPL-MCNC: 0.7 MG/DL (ref 0–1.2)
BNP SERPL-MCNC: 55 PG/ML (ref 0–99)
BUN SERPL-MCNC: 25 MG/DL (ref 6–23)
CALCIUM SERPL-MCNC: 9.2 MG/DL (ref 8.6–10.3)
CARDIAC TROPONIN I PNL SERPL HS: 4 NG/L (ref 0–20)
CHLORIDE SERPL-SCNC: 100 MMOL/L (ref 98–107)
CO2 SERPL-SCNC: 28 MMOL/L (ref 21–32)
CREAT SERPL-MCNC: 1.57 MG/DL (ref 0.5–1.3)
EOSINOPHIL # BLD AUTO: 0.41 X10*3/UL (ref 0–0.4)
EOSINOPHIL NFR BLD AUTO: 4.6 %
ERYTHROCYTE [DISTWIDTH] IN BLOOD BY AUTOMATED COUNT: 13 % (ref 11.5–14.5)
GFR SERPL CREATININE-BSD FRML MDRD: 46 ML/MIN/1.73M*2
GLUCOSE SERPL-MCNC: 141 MG/DL (ref 74–99)
HCT VFR BLD AUTO: 45 % (ref 41–52)
HGB BLD-MCNC: 15.5 G/DL (ref 13.5–17.5)
IMM GRANULOCYTES # BLD AUTO: 0.04 X10*3/UL (ref 0–0.5)
IMM GRANULOCYTES NFR BLD AUTO: 0.4 % (ref 0–0.9)
LYMPHOCYTES # BLD AUTO: 1.98 X10*3/UL (ref 0.8–3)
LYMPHOCYTES NFR BLD AUTO: 22.2 %
MAGNESIUM SERPL-MCNC: 2.1 MG/DL (ref 1.6–2.4)
MCH RBC QN AUTO: 31.2 PG (ref 26–34)
MCHC RBC AUTO-ENTMCNC: 34.4 G/DL (ref 32–36)
MCV RBC AUTO: 91 FL (ref 80–100)
MONOCYTES # BLD AUTO: 0.71 X10*3/UL (ref 0.05–0.8)
MONOCYTES NFR BLD AUTO: 8 %
NEUTROPHILS # BLD AUTO: 5.66 X10*3/UL (ref 1.6–5.5)
NEUTROPHILS NFR BLD AUTO: 63.5 %
NRBC BLD-RTO: 0 /100 WBCS (ref 0–0)
PLATELET # BLD AUTO: 198 X10*3/UL (ref 150–450)
POTASSIUM SERPL-SCNC: 5 MMOL/L (ref 3.5–5.3)
PROT SERPL-MCNC: 6.6 G/DL (ref 6.4–8.2)
RBC # BLD AUTO: 4.97 X10*6/UL (ref 4.5–5.9)
SODIUM SERPL-SCNC: 137 MMOL/L (ref 136–145)
WBC # BLD AUTO: 8.9 X10*3/UL (ref 4.4–11.3)

## 2023-12-15 PROCEDURE — 36415 COLL VENOUS BLD VENIPUNCTURE: CPT | Performed by: GENERAL PRACTICE

## 2023-12-15 PROCEDURE — 93005 ELECTROCARDIOGRAM TRACING: CPT

## 2023-12-15 PROCEDURE — 71045 X-RAY EXAM CHEST 1 VIEW: CPT

## 2023-12-15 PROCEDURE — 83735 ASSAY OF MAGNESIUM: CPT | Performed by: GENERAL PRACTICE

## 2023-12-15 PROCEDURE — 83880 ASSAY OF NATRIURETIC PEPTIDE: CPT | Performed by: GENERAL PRACTICE

## 2023-12-15 PROCEDURE — 80053 COMPREHEN METABOLIC PANEL: CPT | Performed by: GENERAL PRACTICE

## 2023-12-15 PROCEDURE — 70450 CT HEAD/BRAIN W/O DYE: CPT | Performed by: RADIOLOGY

## 2023-12-15 PROCEDURE — 99284 EMERGENCY DEPT VISIT MOD MDM: CPT | Performed by: GENERAL PRACTICE

## 2023-12-15 PROCEDURE — 85025 COMPLETE CBC W/AUTO DIFF WBC: CPT | Performed by: GENERAL PRACTICE

## 2023-12-15 PROCEDURE — 2500000004 HC RX 250 GENERAL PHARMACY W/ HCPCS (ALT 636 FOR OP/ED): Performed by: GENERAL PRACTICE

## 2023-12-15 PROCEDURE — 84484 ASSAY OF TROPONIN QUANT: CPT | Performed by: GENERAL PRACTICE

## 2023-12-15 PROCEDURE — 71045 X-RAY EXAM CHEST 1 VIEW: CPT | Performed by: RADIOLOGY

## 2023-12-15 PROCEDURE — 70450 CT HEAD/BRAIN W/O DYE: CPT

## 2023-12-15 PROCEDURE — 99285 EMERGENCY DEPT VISIT HI MDM: CPT | Mod: 25

## 2023-12-15 RX ADMIN — SODIUM CHLORIDE 1000 ML: 9 INJECTION, SOLUTION INTRAVENOUS at 20:35

## 2023-12-15 ASSESSMENT — COLUMBIA-SUICIDE SEVERITY RATING SCALE - C-SSRS
6. HAVE YOU EVER DONE ANYTHING, STARTED TO DO ANYTHING, OR PREPARED TO DO ANYTHING TO END YOUR LIFE?: NO
2. HAVE YOU ACTUALLY HAD ANY THOUGHTS OF KILLING YOURSELF?: NO
1. IN THE PAST MONTH, HAVE YOU WISHED YOU WERE DEAD OR WISHED YOU COULD GO TO SLEEP AND NOT WAKE UP?: NO

## 2023-12-15 NOTE — ED PROVIDER NOTES
"HPI   Chief Complaint   Patient presents with    Syncope     Pt [place] ED via [place] Pt A&Ox4 in triage. Pt hypotensive for EMS.       HPI: 73-year-old male with a history of hypertension and depression presents for syncope.  [place] began to feel very tired and states \"the next thing I know I was on the ground \".  He did have dental surgery in the past week and does report taking an unspecified medication for pain today.  He denies any alcohol or illicit drug intake.  He is denying chest pain, shortness of breath, headache and change in vision      Limitations to history: None  Independent Historians: Patient  External Records Reviewed: HIE, outpatient notes, inpatient notes  ------------------------------------------------------------------------------------------------------------------------------------------  ROS: a ten point review of systems was performed and was negative except as per HPI.  ------------------------------------------------------------------------------------------------------------------------------------------  PMH / PSH: as per HPI, otherwise reviewed in EMR  MEDS: as per HPI, otherwise reviewed in EMR  ALLERGIES: as per HPI, otherwise reviewed in EMR  SocH:  as per HPI, otherwise reviewed in EMR  FH:  as per HPI, otherwise reviewed in EMR  ------------------------------------------------------------------------------------------------------------------------------------------  Physical Exam:  VS: As documented in the triage note and EMR flowsheet from this visit was reviewed  General: Well appearing. No acute distress.   Eyes:  Extraocular movements grossly intact. No scleral icterus. No discharge  HEENT:  Normocephalic.  Atraumatic  Neck: Moves neck freely. No gross masses  CV: Regular rhythm. No murmurs, rubs or gallops   Resp: Clear to auscultation bilaterally. No respiratory distress.    GI: Soft, no masses, " nontender. No rebound tenderness or guarding  MSK: Symmetric muscle bulk. No deformities. No lower extremity edema.    Skin: Warm, dry, intact.   Neuro: No focal deficits.  A&O x3.   Psych: Appropriate for situation  ------------------------------------------------------------------------------------------------------------------------------------------  Hospital Course / Medical Decision Making:  Independent Interpretations: CT head, chest xray  EKG as interpreted by me: Normal sinus rhythm at 60 bpm with a normal axis, no bundle branch block and no signs of acute ischemia    MDM: This is a 73-year-old male with a history of hypertension presenting for syncope.  He is hemodynamically stable in the ED and well-appearing.  He is neurologically intact.  Troponin nonelevated.  There is an acute kidney injury present.  The patient was given IV fluids.  CT shows no acute intracranial process.  No cervical spine tenderness, cervical spine cleared by Nexus criteria.  Patient was signed out to Dr. Lake pending a repeat BMP to see if the patient's acute kidney injury improves.  If it does the patient does state that he feels safe going home.    Discussion of Management with Other Providers:   I discussed the patient/results with: Emergency medicine team    Final diagnosis and disposition as below.    Results for orders placed or performed during the hospital encounter of 12/15/23  -CBC and Auto Differential:        Result                      Value             Ref Range           WBC                         8.9               4.4 - 11.3 x*       nRBC                        0.0               0.0 - 0.0 /1*       RBC                         4.97              4.50 - 5.90 *       Hemoglobin                  15.5              13.5 - 17.5 *       Hematocrit                  45.0              41.0 - 52.0 %       MCV                         91                80 - 100 fL         MCH                         31.2              26.0  - 34.0 *       MCHC                        34.4              32.0 - 36.0 *       RDW                         13.0              11.5 - 14.5 %       Platelets                   198               150 - 450 x1*       Neutrophils %               63.5              40.0 - 80.0 %       Immature Granulocytes *     0.4               0.0 - 0.9 %         Lymphocytes %               22.2              13.0 - 44.0 %       Monocytes %                 8.0               2.0 - 10.0 %        Eosinophils %               4.6               0.0 - 6.0 %         Basophils %                 1.3               0.0 - 2.0 %         Neutrophils Absolute        5.66 (H)          1.60 - 5.50 *       Immature Granulocytes *     0.04              0.00 - 0.50 *       Lymphocytes Absolute        1.98              0.80 - 3.00 *       Monocytes Absolute          0.71              0.05 - 0.80 *       Eosinophils Absolute        0.41 (H)          0.00 - 0.40 *       Basophils Absolute          0.12 (H)          0.00 - 0.10 *  -Troponin I, High Sensitivity:        Result                      Value             Ref Range           Troponin I, High Sensi*     4                 0 - 20 ng/L    -B-Type Natriuretic Peptide:        Result                      Value             Ref Range           BNP                         55                0 - 99 pg/mL   CT head wo IV contrast   Final Result    No evidence of acute cortical infarct or intracranial hemorrhage.          MACRO:    None                Signed by: Deonte Gaitan 12/15/2023 8:17 PM    Dictation workstation:   [id number]     XR chest 1 view   Final Result    Cardiomegaly. No evidence of acute intrathoracic abnormality.          Signed by: Ruperto Joel 12/15/2023 5:55 PM    Dictation workstation:   [id number]                                 [place] Coma Scale Score: 15                  Patient History   No past medical history on file.  Past Surgical History:   Procedure Laterality Date    MR HEAD ANGIO  WO IV CONTRAST  3/22/2022    MR HEAD ANGIO WO IV CONTRAST 3/22/2022 [facility]    OTHER SURGICAL HISTORY  09/19/2013    Ear Surgery    OTHER SURGICAL HISTORY  10/27/2021    Thyroid surgery     No family history on file.  Social History     Tobacco Use    Smoking status: Never    Smokeless tobacco: Not on file   Substance Use Topics    Alcohol use: Never    Drug use: Not on file       Physical Exam   ED Triage Vitals [12/15/23 1724]   Temp Heart Rate Resp BP   36.2 °C (97.2 °F) 56 18 101/69      SpO2 Temp src Heart Rate Source Patient Position   91 % -- -- --      BP Location FiO2 (%)     -- --       Physical Exam    ED Course & MDM   Diagnoses as of 12/18/23 1806   COTY (acute kidney injury) (CMS/[facility])   Syncope and collapse       Medical Decision Making      Procedure  Procedures     Bonilla Tay,   12/18/23 1811

## 2023-12-16 VITALS
SYSTOLIC BLOOD PRESSURE: 107 MMHG | WEIGHT: 200 LBS | TEMPERATURE: 97.2 F | DIASTOLIC BLOOD PRESSURE: 64 MMHG | OXYGEN SATURATION: 94 % | HEART RATE: 61 BPM | HEIGHT: 69 IN | BODY MASS INDEX: 29.62 KG/M2 | RESPIRATION RATE: 16 BRPM

## 2023-12-16 LAB
ANION GAP SERPL CALC-SCNC: 11 MMOL/L (ref 10–20)
BUN SERPL-MCNC: 25 MG/DL (ref 6–23)
CALCIUM SERPL-MCNC: 9.2 MG/DL (ref 8.6–10.3)
CHLORIDE SERPL-SCNC: 103 MMOL/L (ref 98–107)
CO2 SERPL-SCNC: 30 MMOL/L (ref 21–32)
CREAT SERPL-MCNC: 1.46 MG/DL (ref 0.5–1.3)
GFR SERPL CREATININE-BSD FRML MDRD: 50 ML/MIN/1.73M*2
GLUCOSE SERPL-MCNC: 101 MG/DL (ref 74–99)
POTASSIUM SERPL-SCNC: 3.6 MMOL/L (ref 3.5–5.3)
SODIUM SERPL-SCNC: 140 MMOL/L (ref 136–145)

## 2023-12-16 PROCEDURE — 80048 BASIC METABOLIC PNL TOTAL CA: CPT | Performed by: GENERAL PRACTICE

## 2023-12-16 NOTE — PROGRESS NOTES
I received this in signout from Dr. Tay.  The patient has a BMP pending.  BMP shows gradual improvement of the patient's COTY.  The patient has no further symptoms.  He is able to ambulate.  Will discharge home.  Return precautions given.

## 2023-12-19 LAB
ATRIAL RATE: 60 BPM
P AXIS: 45 DEGREES
P OFFSET: 199 MS
P ONSET: 140 MS
PR INTERVAL: 146 MS
Q ONSET: 213 MS
QRS COUNT: 10 BEATS
QRS DURATION: 80 MS
QT INTERVAL: 398 MS
QTC CALCULATION(BAZETT): 398 MS
QTC FREDERICIA: 398 MS
R AXIS: -10 DEGREES
T AXIS: 43 DEGREES
T OFFSET: 412 MS
VENTRICULAR RATE: 60 BPM

## 2023-12-28 ENCOUNTER — LAB (OUTPATIENT)
Dept: LAB | Facility: LAB | Age: 73
End: 2023-12-28
Payer: MEDICARE

## 2023-12-28 ENCOUNTER — OFFICE VISIT (OUTPATIENT)
Dept: CARDIOLOGY | Facility: CLINIC | Age: 73
End: 2023-12-28
Payer: MEDICARE

## 2023-12-28 VITALS
DIASTOLIC BLOOD PRESSURE: 74 MMHG | SYSTOLIC BLOOD PRESSURE: 120 MMHG | HEIGHT: 69 IN | BODY MASS INDEX: 29.33 KG/M2 | WEIGHT: 198 LBS | RESPIRATION RATE: 16 BRPM | HEART RATE: 59 BPM | OXYGEN SATURATION: 97 %

## 2023-12-28 DIAGNOSIS — I10 HYPERTENSION, UNSPECIFIED TYPE: Primary | ICD-10-CM

## 2023-12-28 DIAGNOSIS — E78.00 LOW-DENSITY-LIPOID-TYPE (LDL) HYPERLIPOPROTEINEMIA: ICD-10-CM

## 2023-12-28 DIAGNOSIS — Z12.5 ENCOUNTER FOR PROSTATE CANCER SCREENING: ICD-10-CM

## 2023-12-28 DIAGNOSIS — I10 HYPERTENSION, UNSPECIFIED TYPE: ICD-10-CM

## 2023-12-28 DIAGNOSIS — M54.9 BACK PAIN, UNSPECIFIED BACK LOCATION, UNSPECIFIED BACK PAIN LATERALITY, UNSPECIFIED CHRONICITY: ICD-10-CM

## 2023-12-28 LAB
ALBUMIN SERPL BCP-MCNC: 4.2 G/DL (ref 3.4–5)
ALP SERPL-CCNC: 50 U/L (ref 33–136)
ALT SERPL W P-5'-P-CCNC: 20 U/L (ref 10–52)
ANION GAP SERPL CALC-SCNC: 15 MMOL/L (ref 10–20)
APPEARANCE UR: CLEAR
AST SERPL W P-5'-P-CCNC: 18 U/L (ref 9–39)
BASOPHILS # BLD AUTO: 0.08 X10*3/UL (ref 0–0.1)
BASOPHILS NFR BLD AUTO: 0.9 %
BILIRUB SERPL-MCNC: 1.1 MG/DL (ref 0–1.2)
BILIRUB UR STRIP.AUTO-MCNC: NEGATIVE MG/DL
BUN SERPL-MCNC: 25 MG/DL (ref 6–23)
CALCIUM SERPL-MCNC: 9.9 MG/DL (ref 8.6–10.3)
CHLORIDE SERPL-SCNC: 101 MMOL/L (ref 98–107)
CHOLEST SERPL-MCNC: 157 MG/DL (ref 0–199)
CHOLESTEROL/HDL RATIO: 4.3
CO2 SERPL-SCNC: 31 MMOL/L (ref 21–32)
COLOR UR: ABNORMAL
CREAT SERPL-MCNC: 1.19 MG/DL (ref 0.5–1.3)
EOSINOPHIL # BLD AUTO: 0.43 X10*3/UL (ref 0–0.4)
EOSINOPHIL NFR BLD AUTO: 5 %
ERYTHROCYTE [DISTWIDTH] IN BLOOD BY AUTOMATED COUNT: 12.7 % (ref 11.5–14.5)
GFR SERPL CREATININE-BSD FRML MDRD: 64 ML/MIN/1.73M*2
GLUCOSE SERPL-MCNC: 128 MG/DL (ref 74–99)
GLUCOSE UR STRIP.AUTO-MCNC: NEGATIVE MG/DL
HCT VFR BLD AUTO: 47 % (ref 41–52)
HDLC SERPL-MCNC: 36.5 MG/DL
HGB BLD-MCNC: 15.9 G/DL (ref 13.5–17.5)
IMM GRANULOCYTES # BLD AUTO: 0.02 X10*3/UL (ref 0–0.5)
IMM GRANULOCYTES NFR BLD AUTO: 0.2 % (ref 0–0.9)
KETONES UR STRIP.AUTO-MCNC: NEGATIVE MG/DL
LDLC SERPL CALC-MCNC: 64 MG/DL
LEUKOCYTE ESTERASE UR QL STRIP.AUTO: NEGATIVE
LYMPHOCYTES # BLD AUTO: 1.97 X10*3/UL (ref 0.8–3)
LYMPHOCYTES NFR BLD AUTO: 22.8 %
MCH RBC QN AUTO: 31.2 PG (ref 26–34)
MCHC RBC AUTO-ENTMCNC: 33.8 G/DL (ref 32–36)
MCV RBC AUTO: 92 FL (ref 80–100)
MONOCYTES # BLD AUTO: 0.46 X10*3/UL (ref 0.05–0.8)
MONOCYTES NFR BLD AUTO: 5.3 %
NEUTROPHILS # BLD AUTO: 5.68 X10*3/UL (ref 1.6–5.5)
NEUTROPHILS NFR BLD AUTO: 65.8 %
NITRITE UR QL STRIP.AUTO: NEGATIVE
NON HDL CHOLESTEROL: 121 MG/DL (ref 0–149)
NRBC BLD-RTO: 0 /100 WBCS (ref 0–0)
PH UR STRIP.AUTO: 5 [PH]
PLATELET # BLD AUTO: 213 X10*3/UL (ref 150–450)
POTASSIUM SERPL-SCNC: 3.2 MMOL/L (ref 3.5–5.3)
PROT SERPL-MCNC: 6.3 G/DL (ref 6.4–8.2)
PROT UR STRIP.AUTO-MCNC: NEGATIVE MG/DL
RBC # BLD AUTO: 5.09 X10*6/UL (ref 4.5–5.9)
RBC # UR STRIP.AUTO: NEGATIVE /UL
SODIUM SERPL-SCNC: 144 MMOL/L (ref 136–145)
SP GR UR STRIP.AUTO: 1.03
TRIGL SERPL-MCNC: 285 MG/DL (ref 0–149)
TSH SERPL-ACNC: 1.54 MIU/L (ref 0.44–3.98)
UROBILINOGEN UR STRIP.AUTO-MCNC: 2 MG/DL
VLDL: 57 MG/DL (ref 0–40)
WBC # BLD AUTO: 8.6 X10*3/UL (ref 4.4–11.3)

## 2023-12-28 PROCEDURE — 99215 OFFICE O/P EST HI 40 MIN: CPT | Performed by: NURSE PRACTITIONER

## 2023-12-28 PROCEDURE — 1036F TOBACCO NON-USER: CPT | Performed by: NURSE PRACTITIONER

## 2023-12-28 PROCEDURE — 85025 COMPLETE CBC W/AUTO DIFF WBC: CPT

## 2023-12-28 PROCEDURE — 80053 COMPREHEN METABOLIC PANEL: CPT

## 2023-12-28 PROCEDURE — 1159F MED LIST DOCD IN RCRD: CPT | Performed by: NURSE PRACTITIONER

## 2023-12-28 PROCEDURE — 80061 LIPID PANEL: CPT

## 2023-12-28 PROCEDURE — 3074F SYST BP LT 130 MM HG: CPT | Performed by: NURSE PRACTITIONER

## 2023-12-28 PROCEDURE — 84443 ASSAY THYROID STIM HORMONE: CPT

## 2023-12-28 PROCEDURE — 81003 URINALYSIS AUTO W/O SCOPE: CPT

## 2023-12-28 PROCEDURE — 1126F AMNT PAIN NOTED NONE PRSNT: CPT | Performed by: NURSE PRACTITIONER

## 2023-12-28 PROCEDURE — 1160F RVW MEDS BY RX/DR IN RCRD: CPT | Performed by: NURSE PRACTITIONER

## 2023-12-28 PROCEDURE — G0103 PSA SCREENING: HCPCS

## 2023-12-28 PROCEDURE — 3078F DIAST BP <80 MM HG: CPT | Performed by: NURSE PRACTITIONER

## 2023-12-28 PROCEDURE — 36415 COLL VENOUS BLD VENIPUNCTURE: CPT

## 2023-12-28 RX ORDER — OLMESARTAN MEDOXOMIL 40 MG/1
40 TABLET ORAL DAILY
Qty: 90 TABLET | Refills: 1 | Status: SHIPPED | OUTPATIENT
Start: 2023-12-28 | End: 2024-12-27

## 2023-12-28 RX ORDER — OXYCODONE HYDROCHLORIDE 5 MG/1
5 TABLET ORAL
COMMUNITY
Start: 2023-12-12

## 2023-12-28 RX ORDER — OLMESARTAN MEDOXOMIL AND HYDROCHLOROTHIAZIDE 40/25 40; 25 MG/1; MG/1
1 TABLET ORAL DAILY
COMMUNITY
End: 2023-12-28

## 2023-12-28 RX ORDER — MELATONIN 10 MG
CAPSULE ORAL
COMMUNITY

## 2023-12-28 ASSESSMENT — ENCOUNTER SYMPTOMS
DEPRESSION: 0
CARDIOVASCULAR NEGATIVE: 1
CONSTITUTIONAL NEGATIVE: 1
GASTROINTESTINAL NEGATIVE: 1
MUSCULOSKELETAL NEGATIVE: 1
RESPIRATORY NEGATIVE: 1
NEUROLOGICAL NEGATIVE: 1

## 2023-12-28 ASSESSMENT — PATIENT HEALTH QUESTIONNAIRE - PHQ9
SUM OF ALL RESPONSES TO PHQ9 QUESTIONS 1 AND 2: 0
2. FEELING DOWN, DEPRESSED OR HOPELESS: NOT AT ALL
SUM OF ALL RESPONSES TO PHQ9 QUESTIONS 1 AND 2: 1
1. LITTLE INTEREST OR PLEASURE IN DOING THINGS: NOT AT ALL
1. LITTLE INTEREST OR PLEASURE IN DOING THINGS: NOT AT ALL
2. FEELING DOWN, DEPRESSED OR HOPELESS: SEVERAL DAYS

## 2023-12-28 NOTE — PROGRESS NOTES
"Chief Complaint:   Follow-up (Syncopal episode two weeks ago)    History Of Present Illness:    .Patient had a syncopal episode at the [facility] in the setting of taking several pain meds after a dental procedure.  Denies chest pain, sob, palpitations or pedal edema.            Last Recorded Vitals:  Blood pressure 120/74, pulse 59, resp. rate 16, height 1.753 m (5' 9\"), weight 89.8 kg (198 lb), SpO2 97 %.     Past Medical History:  No past medical history on file.     Past Surgical History:  Past Surgical History:   Procedure Laterality Date    MR HEAD ANGIO WO IV CONTRAST  3/22/2022    MR HEAD ANGIO WO IV CONTRAST 3/22/2022 [facility]    OTHER SURGICAL HISTORY  09/19/2013    Ear Surgery    OTHER SURGICAL HISTORY  10/27/2021    Thyroid surgery       Social History:  Social History     Socioeconomic History    Marital status:      Spouse name: None    Number of children: None    Years of education: None    Highest education level: None   Occupational History    None   Tobacco Use    Smoking status: Former     Types: Cigarettes    Smokeless tobacco: Never   Substance and Sexual Activity    Alcohol use: Never    Drug use: Never    Sexual activity: None   Other Topics Concern    None   Social History Narrative    None     Social Determinants of Health     Financial Resource Strain: Not on file   Food Insecurity: Not on file   Transportation Needs: Not on file   Physical Activity: Not on file   Stress: Not on file   Social Connections: Not on file   Intimate Partner Violence: Not on file   Housing Stability: Not on file       Family History:  No family history on file.      Allergies:  Patient has no known allergies.    Outpatient Medications:  Current Outpatient Medications   Medication Sig Dispense Refill    escitalopram (Lexapro) 20 mg tablet TAKE 1 TABLET BY MOUTH EVERY DAY 90 tablet 0    haloperidol (Haldol) 0.5 mg tablet Take 1 tablet (0.5 mg) by mouth 2 times a day. 180 tablet 1    ibuprofen 600 mg " tablet Take 1 tablet (600 mg) by mouth 3 times a day as needed.      melatonin 10 mg capsule Take by mouth.      metoprolol succinate XL (Toprol-XL) 50 mg 24 hr tablet Take 1 tablet (50 mg) by mouth once daily.      nabumetone (Relafen) 750 mg tablet Take 1 tablet (750 mg) by mouth every 12 hours. 60 tablet 0    olmesartan-hydrochlorothiazide (BENIcar HCT) 40-25 mg tablet Take 1 tablet by mouth once daily.      omeprazole (PriLOSEC) 40 mg DR capsule TAKE 1 CAPSULE (40 MG) BY MOUTH ONCE DAILY IN THE MORNING. TAKE BEFORE MEALS. DO NOT CRUSH OR CHEW. 90 capsule 1    oxyCODONE (Roxicodone) 5 mg immediate release tablet Take 1 tablet (5 mg) by mouth.      rosuvastatin (Crestor) 10 mg tablet TAKE 1 TABLET BY MOUTH EVERY DAY (Patient taking differently: Take 4 tablets (40 mg) by mouth once daily.) 90 tablet 0     No current facility-administered medications for this visit.        Physical Exam:  Cardiovascular:      PMI at left midclavicular line. Normal rate. Regular rhythm. Normal S1. Normal S2.       Murmurs: There is no murmur.      No gallop.  No click. No rub.   Pulses:     Intact distal pulses.   Edema:     Peripheral edema absent.         ROS:  Review of Systems   Constitutional: Negative.   Cardiovascular: Negative.    Respiratory: Negative.     Skin: Negative.    Musculoskeletal: Negative.    Gastrointestinal: Negative.    Genitourinary: Negative.    Neurological: Negative.           Last Labs:  CBC -  Lab Results   Component Value Date    WBC 8.9 12/15/2023    HGB 15.5 12/15/2023    HCT 45.0 12/15/2023    MCV 91 12/15/2023     12/15/2023       CMP -  Lab Results   Component Value Date    CALCIUM 9.2 12/16/2023    PROT 6.6 12/15/2023    ALBUMIN 3.9 12/15/2023    AST 32 12/15/2023    ALT 13 12/15/2023    ALKPHOS 32 (L) 12/15/2023    BILITOT 0.7 12/15/2023       LIPID PANEL -   Lab Results   Component Value Date    CHOL 184 06/08/2023    TRIG 255 (H) 06/08/2023    HDL 44.9 06/08/2023    CHHDL 4.1 06/08/2023     LDLF 88 06/08/2023    VLDL 51 (H) 06/08/2023    NHDL 139 06/08/2023       RENAL FUNCTION PANEL -   Lab Results   Component Value Date    GLUCOSE 101 (H) 12/16/2023     12/16/2023    K 3.6 12/16/2023     12/16/2023    CO2 30 12/16/2023    ANIONGAP 11 12/16/2023    BUN 25 (H) 12/16/2023    CREATININE 1.46 (H) 12/16/2023    GFRMALE 79 06/08/2023    CALCIUM 9.2 12/16/2023    ALBUMIN 3.9 12/15/2023        Lab Results   Component Value Date    BNP 55 12/15/2023         Assessment/Plan   Problem List Items Addressed This Visit    None   Hypertension.  Will omit hydrochlorothiazide from the olmesartan.  Syncope.      COTY.  Cr lucia to 1.57 during hospital stay 12/2023. The patient was given IV fluids.  CT shows no acute intracranial process. Cr did improve and will recheck today.   Hyperlipidemia  5.  CAD.  Coronary artery calcium score was 87.23 when done 12/2023.  Stress echo 07/2020  Summary:   1. No clinical, echocardiographic or electrocardiographic evidence for ischemia at a maximal workload.   2. No increased arrhythmias note with exercise .   3. The adequate level of stress was achieved.   4. The resting ejection fraction was estimated at 60 to 65% with a peak exercise ejection fraction estimated at 70 to 75%.      Xiomara Lainez, APRN-CNP

## 2023-12-29 ENCOUNTER — TELEPHONE (OUTPATIENT)
Dept: PRIMARY CARE | Facility: CLINIC | Age: 73
End: 2023-12-29
Payer: MEDICARE

## 2023-12-29 DIAGNOSIS — I10 HYPERTENSION, UNSPECIFIED TYPE: Primary | ICD-10-CM

## 2023-12-29 LAB — PSA SERPL-MCNC: 0.53 NG/ML

## 2023-12-29 RX ORDER — POTASSIUM CHLORIDE 20 MEQ/1
20 TABLET, EXTENDED RELEASE ORAL DAILY
Qty: 30 TABLET | Refills: 1 | Status: SHIPPED | OUTPATIENT
Start: 2023-12-29 | End: 2024-01-22

## 2023-12-29 NOTE — TELEPHONE ENCOUNTER
----- Message from Mayela Barrett MD sent at 12/29/2023 12:35 AM EST -----  Make an appt 5 - 7 days  order bmp pl  take potassium pl tell mpt

## 2023-12-29 NOTE — TELEPHONE ENCOUNTER
left message to schedule a follow up appt. and that he shoudl be taking potassium. I also let him know we will be ordering a few blood draws for him.

## 2024-01-20 DIAGNOSIS — I10 HYPERTENSION, UNSPECIFIED TYPE: ICD-10-CM

## 2024-01-22 RX ORDER — POTASSIUM CHLORIDE 1500 MG/1
20 TABLET, EXTENDED RELEASE ORAL DAILY
Qty: 90 TABLET | Refills: 1 | Status: SHIPPED | OUTPATIENT
Start: 2024-01-22

## 2024-01-29 ENCOUNTER — TELEPHONE (OUTPATIENT)
Dept: PRIMARY CARE | Facility: CLINIC | Age: 74
End: 2024-01-29
Payer: MEDICARE

## 2024-01-29 DIAGNOSIS — M54.9 BACK PAIN, UNSPECIFIED BACK LOCATION, UNSPECIFIED BACK PAIN LATERALITY, UNSPECIFIED CHRONICITY: ICD-10-CM

## 2024-01-31 RX ORDER — NABUMETONE 750 MG/1
750 TABLET, FILM COATED ORAL EVERY 12 HOURS
Qty: 60 TABLET | Refills: 5 | Status: SHIPPED | OUTPATIENT
Start: 2024-01-31

## 2024-03-26 PROBLEM — S09.90XA INJURY OF HEAD: Status: ACTIVE | Noted: 2024-03-26

## 2024-03-26 PROBLEM — R00.2 PALPITATIONS: Status: ACTIVE | Noted: 2023-09-26

## 2024-03-26 PROBLEM — F23 BRIEF PSYCHOTIC DISORDER (MULTI): Status: ACTIVE | Noted: 2024-03-26

## 2024-03-26 PROBLEM — E53.9 VITAMIN B DEFICIENCY: Status: ACTIVE | Noted: 2023-06-08

## 2024-03-26 PROBLEM — J00 NASOPHARYNGITIS: Status: ACTIVE | Noted: 2023-05-03

## 2024-03-26 PROBLEM — K82.9 GALLBLADDER PAIN: Status: ACTIVE | Noted: 2023-07-25

## 2024-03-26 PROBLEM — D18.01 HEMANGIOMA OF SKIN AND SUBCUTANEOUS TISSUE: Status: ACTIVE | Noted: 2021-05-10
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
## 2024-03-26 PROBLEM — R21 RASH: Status: ACTIVE | Noted: 2024-03-26

## 2024-03-26 PROBLEM — L91.8 OTHER HYPERTROPHIC DISORDERS OF THE SKIN: Status: ACTIVE | Noted: 2021-05-10

## 2024-03-26 PROBLEM — R55 NEAR SYNCOPE: Status: ACTIVE | Noted: 2024-03-26

## 2024-03-26 PROBLEM — E55.9 VITAMIN D DEFICIENCY: Status: ACTIVE | Noted: 2023-06-08

## 2024-03-26 PROBLEM — F41.9 ANXIETY: Status: ACTIVE | Noted: 2024-03-26

## 2024-03-26 PROBLEM — D22.30 MELANOCYTIC NEVI OF UNSPECIFIED PART OF FACE: Status: ACTIVE | Noted: 2021-05-10

## 2024-03-26 PROBLEM — L81.4 OTHER MELANIN HYPERPIGMENTATION: Status: ACTIVE | Noted: 2021-05-10

## 2024-03-26 PROBLEM — R06.00 DYSPNEA: Status: ACTIVE | Noted: 2024-03-26

## 2024-03-26 PROBLEM — F20.9 SCHIZOPHRENIA (MULTI): Status: ACTIVE | Noted: 2024-03-26

## 2024-03-26 PROBLEM — I63.9 CEREBROVASCULAR ACCIDENT (CVA) (MULTI): Status: ACTIVE | Noted: 2024-03-26

## 2024-03-26 PROBLEM — R07.9 CHEST PAIN: Status: ACTIVE | Noted: 2023-07-20

## 2024-03-26 PROBLEM — L82.1 OTHER SEBORRHEIC KERATOSIS: Status: ACTIVE | Noted: 2021-05-10

## 2024-03-26 PROBLEM — N28.1 CYST OF KIDNEY, ACQUIRED: Status: ACTIVE | Noted: 2023-07-25

## 2024-03-26 PROBLEM — K21.9 GASTROESOPHAGEAL REFLUX DISEASE WITHOUT ESOPHAGITIS: Status: ACTIVE | Noted: 2024-03-26

## 2024-03-26 RX ORDER — AMOXICILLIN AND CLAVULANATE POTASSIUM 875; 125 MG/1; MG/1
1 TABLET, FILM COATED ORAL EVERY 12 HOURS
COMMUNITY
Start: 2024-02-20

## 2024-03-27 ENCOUNTER — OFFICE VISIT (OUTPATIENT)
Dept: CARDIOLOGY | Facility: CLINIC | Age: 74
End: 2024-03-27
Payer: MEDICARE

## 2024-03-27 VITALS
OXYGEN SATURATION: 98 % | HEIGHT: 69 IN | HEART RATE: 66 BPM | WEIGHT: 199.8 LBS | BODY MASS INDEX: 29.59 KG/M2 | SYSTOLIC BLOOD PRESSURE: 148 MMHG | DIASTOLIC BLOOD PRESSURE: 92 MMHG

## 2024-03-27 DIAGNOSIS — I10 HYPERTENSION, UNSPECIFIED TYPE: Primary | ICD-10-CM

## 2024-03-27 PROCEDURE — 3077F SYST BP >= 140 MM HG: CPT | Performed by: INTERNAL MEDICINE

## 2024-03-27 PROCEDURE — 1036F TOBACCO NON-USER: CPT | Performed by: INTERNAL MEDICINE

## 2024-03-27 PROCEDURE — 1159F MED LIST DOCD IN RCRD: CPT | Performed by: INTERNAL MEDICINE

## 2024-03-27 PROCEDURE — 1126F AMNT PAIN NOTED NONE PRSNT: CPT | Performed by: INTERNAL MEDICINE

## 2024-03-27 PROCEDURE — 3079F DIAST BP 80-89 MM HG: CPT | Performed by: INTERNAL MEDICINE

## 2024-03-27 PROCEDURE — 99214 OFFICE O/P EST MOD 30 MIN: CPT | Performed by: INTERNAL MEDICINE

## 2024-03-27 RX ORDER — AMLODIPINE BESYLATE 5 MG/1
5 TABLET ORAL DAILY
Qty: 90 TABLET | Refills: 1 | Status: SHIPPED | OUTPATIENT
Start: 2024-03-27 | End: 2025-03-27

## 2024-03-27 RX ORDER — ROSUVASTATIN CALCIUM 40 MG/1
40 TABLET, COATED ORAL EVERY EVENING
COMMUNITY
Start: 2024-03-22

## 2024-03-27 ASSESSMENT — COLUMBIA-SUICIDE SEVERITY RATING SCALE - C-SSRS
1. IN THE PAST MONTH, HAVE YOU WISHED YOU WERE DEAD OR WISHED YOU COULD GO TO SLEEP AND NOT WAKE UP?: NO
2. HAVE YOU ACTUALLY HAD ANY THOUGHTS OF KILLING YOURSELF?: NO
6. HAVE YOU EVER DONE ANYTHING, STARTED TO DO ANYTHING, OR PREPARED TO DO ANYTHING TO END YOUR LIFE?: NO

## 2024-03-27 ASSESSMENT — PAIN SCALES - GENERAL: PAINLEVEL: 0-NO PAIN

## 2024-03-27 ASSESSMENT — PATIENT HEALTH QUESTIONNAIRE - PHQ9
SUM OF ALL RESPONSES TO PHQ9 QUESTIONS 1 AND 2: 0
1. LITTLE INTEREST OR PLEASURE IN DOING THINGS: NOT AT ALL
2. FEELING DOWN, DEPRESSED OR HOPELESS: NOT AT ALL

## 2024-03-27 NOTE — PATIENT INSTRUCTIONS
Amlodipine 5 mg Take 1 tablet by mouth once daily (Sent to your pharmacy)   Labs done 12/2023  Follow up in 4 months

## 2024-03-27 NOTE — PROGRESS NOTES
"Primary Care Physician: Mayela Barrett MD  Date of Visit: 03/27/2024  2:30 PM EDT  Location of visit: [facility]     Chief Complaint:   Chief Complaint   Patient presents with    Follow-up     3 month follow up     Palpitations    Chest Pain    Hypertension    Hyperlipidemia    carotid bruit     HPI / Summary:   Adithya Tirado is a 73 y.o. male presents for follow-up    ROS    Medical History:   He has no past medical history on file.  Surgical Hx:   He has a past surgical history that includes Other surgical history (09/19/2013); Other surgical history (10/27/2021); and MR angio head wo IV contrast (3/22/2022).   Social Hx:   He reports that he has quit smoking. His smoking use included cigarettes. He has never used smokeless tobacco. He reports that he does not drink alcohol and does not use drugs.  Family Hx:   His family history is not on file.   Allergies:  No Known Allergies  Outpatient Medications:  Current Outpatient Medications   Medication Instructions    amoxicillin-pot clavulanate (Augmentin) 875-125 mg tablet 1 tablet, oral, Every 12 hours    escitalopram (LEXAPRO) 20 mg, oral, Daily    haloperidol (HALDOL) 0.5 mg, oral, 2 times daily    ibuprofen 600 mg tablet 1 tablet, oral, 3 times daily PRN    Klor-Con M20 20 mEq ER tablet 20 mEq, oral, Daily, Do not crush or chew    melatonin 10 mg capsule oral    metoprolol succinate XL (Toprol-XL) 50 mg 24 hr tablet 1 tablet, oral, Daily    nabumetone (RELAFEN) 750 mg, oral, Every 12 hours    olmesartan (BENICAR) 40 mg, oral, Daily    omeprazole (PRILOSEC) 40 mg, oral, Daily before breakfast, Do not crush or chew.     oxyCODONE (ROXICODONE) 5 mg, oral    rosuvastatin (CRESTOR) 10 mg, oral, Daily     Physical Exam:  Vitals:    03/27/24 1415   BP: 177/82   BP Location: Right arm   Patient Position: Sitting   BP Cuff Size: Large adult   Pulse: 70   SpO2: 98%   Weight: 90.6 kg (199 lb 12.8 oz)   Height: 1.753 m (5' 9\")     Wt Readings from Last 5 " Encounters:   03/27/24 90.6 kg (199 lb 12.8 oz)   12/28/23 89.8 kg (198 lb)   12/15/23 90.7 kg (200 lb)   12/04/23 90.7 kg (200 lb)   07/26/23 89.9 kg (198 lb 2 oz)     Physical Exam  JVP not elevated. Carotid impulses are 2+ without overlying bruit.   Chest exhibits fair to good air movement with completely clear breath sounds.   The cardiac rhythm is regular with no premature beats.   Normal S1 and S2. No gallop, murmur or rub, or click.   Abdomen is soft and benign without focal tenderness.   With no lower leg edema. The pedal pulses are intact.     Last Labs:  Lab on 12/28/2023   Component Date Value    WBC 12/28/2023 8.6     nRBC 12/28/2023 0.0     RBC 12/28/2023 5.09     Hemoglobin 12/28/2023 15.9     Hematocrit 12/28/2023 47.0     MCV 12/28/2023 92     MCH 12/28/2023 31.2     MCHC 12/28/2023 33.8     RDW 12/28/2023 12.7     Platelets 12/28/2023 213     Neutrophils % 12/28/2023 65.8     Immature Granulocytes %,* 12/28/2023 0.2     Lymphocytes % 12/28/2023 22.8     Monocytes % 12/28/2023 5.3     Eosinophils % 12/28/2023 5.0     Basophils % 12/28/2023 0.9     Neutrophils Absolute 12/28/2023 5.68 (H)     Immature Granulocytes Ab* 12/28/2023 0.02     Lymphocytes Absolute 12/28/2023 1.97     Monocytes Absolute 12/28/2023 0.46     Eosinophils Absolute 12/28/2023 0.43 (H)     Basophils Absolute 12/28/2023 0.08     Glucose 12/28/2023 128 (H)     Sodium 12/28/2023 144     Potassium 12/28/2023 3.2 (L)     Chloride 12/28/2023 101     Bicarbonate 12/28/2023 31     Anion Gap 12/28/2023 15     Urea Nitrogen 12/28/2023 25 (H)     Creatinine 12/28/2023 1.19     eGFR 12/28/2023 64     Calcium 12/28/2023 9.9     Albumin 12/28/2023 4.2     Alkaline Phosphatase 12/28/2023 50     Total Protein 12/28/2023 6.3 (L)     AST 12/28/2023 18     Bilirubin, Total 12/28/2023 1.1     ALT 12/28/2023 20     Color, Urine 12/28/2023 Ernestina (N)     Appearance, Urine 12/28/2023 Clear     Specific Gravity, Urine 12/28/2023 1.026     pH, Urine  12/28/2023 5.0     Protein, Urine 12/28/2023 NEGATIVE     Glucose, Urine 12/28/2023 NEGATIVE     Blood, Urine 12/28/2023 NEGATIVE     Ketones, Urine 12/28/2023 NEGATIVE     Bilirubin, Urine 12/28/2023 NEGATIVE     Urobilinogen, Urine 12/28/2023 2.0 (N)     Nitrite, Urine 12/28/2023 NEGATIVE     Leukocyte Esterase, Urine 12/28/2023 NEGATIVE     Thyroid Stimulating Horm* 12/28/2023 1.54     Cholesterol 12/28/2023 157     HDL-Cholesterol 12/28/2023 36.5     Cholesterol/HDL Ratio 12/28/2023 4.3     LDL Calculated 12/28/2023 64     VLDL 12/28/2023 57 (H)     Triglycerides 12/28/2023 285 (H)     Non HDL Cholesterol 12/28/2023 121     Prostate Specific AG 12/28/2023 0.53    Admission on 12/15/2023, Discharged on 12/16/2023   Component Date Value    WBC 12/15/2023 8.9     nRBC 12/15/2023 0.0     RBC 12/15/2023 4.97     Hemoglobin 12/15/2023 15.5     Hematocrit 12/15/2023 45.0     MCV 12/15/2023 91     MCH 12/15/2023 31.2     MCHC 12/15/2023 34.4     RDW 12/15/2023 13.0     Platelets 12/15/2023 198     Neutrophils % 12/15/2023 63.5     Immature Granulocytes %,* 12/15/2023 0.4     Lymphocytes % 12/15/2023 22.2     Monocytes % 12/15/2023 8.0     Eosinophils % 12/15/2023 4.6     Basophils % 12/15/2023 1.3     Neutrophils Absolute 12/15/2023 5.66 (H)     Immature Granulocytes Ab* 12/15/2023 0.04     Lymphocytes Absolute 12/15/2023 1.98     Monocytes Absolute 12/15/2023 0.71     Eosinophils Absolute 12/15/2023 0.41 (H)     Basophils Absolute 12/15/2023 0.12 (H)     Magnesium 12/15/2023 2.10     Glucose 12/15/2023 141 (H)     Sodium 12/15/2023 137     Potassium 12/15/2023 5.0     Chloride 12/15/2023 100     Bicarbonate 12/15/2023 28     Anion Gap 12/15/2023 14     Urea Nitrogen 12/15/2023 25 (H)     Creatinine 12/15/2023 1.57 (H)     eGFR 12/15/2023 46 (L)     Calcium 12/15/2023 9.2     Albumin 12/15/2023 3.9     Alkaline Phosphatase 12/15/2023 32 (L)     Total Protein 12/15/2023 6.6     AST 12/15/2023 32     Bilirubin, Total  12/15/2023 0.7     ALT 12/15/2023 13     Troponin I, High Sensiti* 12/15/2023 4     BNP 12/15/2023 55     Ventricular Rate 12/15/2023 60     Atrial Rate 12/15/2023 60     NM Interval 12/15/2023 146     QRS Duration 12/15/2023 80     QT Interval 12/15/2023 398     QTC Calculation(Bazett) 12/15/2023 398     P Axis 12/15/2023 45     R Axis 12/15/2023 -10     T Axis 12/15/2023 43     QRS Count 12/15/2023 10     Q Onset 12/15/2023 213     P Onset 12/15/2023 140     P Offset 12/15/2023 199     T Offset 12/15/2023 412     QTC Fredericia 12/15/2023 398     Glucose 12/16/2023 101 (H)     Sodium 12/16/2023 140     Potassium 12/16/2023 3.6     Chloride 12/16/2023 103     Bicarbonate 12/16/2023 30     Anion Gap 12/16/2023 11     Urea Nitrogen 12/16/2023 25 (H)     Creatinine 12/16/2023 1.46 (H)     eGFR 12/16/2023 50 (L)     Calcium 12/16/2023 9.2         Assessment/Plan    Hypertension.  Will omit hydrochlorothiazide from the olmesartan.  The patient's HCTZ as noted was discontinued last visit.  Patient is currently on olmesartan 40 mg daily only.  Repeat lab work from 12/28/2023 included an improvement in the creatinine to 1.19 with a normal CBC.  Somewhat unexpectedly potassium remains only 3.2.  He will remain on a potassium supplement.  Blood pressure is slightly elevated today however and will begin amlodipine 5 mg daily.  Syncope.      COTY.  Cr lucia to 1.57 during hospital stay 12/2023. The patient was given IV fluids.  CT shows no acute intracranial process. Cr did improve and will recheck today.  As noted above the patient's creatinine improved to 1.19 on 12/28/2023 with the discontinuance of HCTZ.  Hyperlipidemia.  Patient's lipid panel 12/28/2023 included cholesterol 157 LDL 64 HDL 36 triglyceride 285.  Continue the rosuvastatin 10 mg daily.  5.  CAD.  Coronary artery calcium score was 87.23 when done 12/2023.  Stress echo 07/2020  Summary:   1. No clinical, echocardiographic or electrocardiographic evidence for  ischemia at a maximal workload.   2. No increased arrhythmias note with exercise .   3. The adequate level of stress was achieved.   4. The resting ejection fraction was estimated at 60 to 65% with a peak exercise ejection fraction estimated at 70 to 75%.  The patient remains asymptomatic without any concerning chest discomfort.        Orders:  No orders of the defined types were placed in this encounter.     Followup Appts:  Future Appointments   Date Time Provider Department Center   3/27/2024  2:30 PM Freddie Montes MD [id number] [facility]   3/28/2024  2:45 PM Freddie Montes MD [id number] [facility]           ____________________________________________________________  Sandra Rowe RN  [facility]  [facility]

## 2024-03-28 ENCOUNTER — APPOINTMENT (OUTPATIENT)
Dept: CARDIOLOGY | Facility: CLINIC | Age: 74
End: 2024-03-28
Payer: MEDICARE

## 2024-06-12 DIAGNOSIS — F32.9 MAJOR DEPRESSIVE DISORDER, SINGLE EPISODE, UNSPECIFIED: ICD-10-CM

## 2024-06-12 DIAGNOSIS — F32.0 CURRENT MILD EPISODE OF MAJOR DEPRESSIVE DISORDER WITHOUT PRIOR EPISODE (CMS-HCC): ICD-10-CM

## 2024-06-12 RX ORDER — ESCITALOPRAM OXALATE 20 MG/1
20 TABLET ORAL DAILY
Qty: 90 TABLET | Refills: 0 | OUTPATIENT
Start: 2024-06-12

## 2024-06-21 DIAGNOSIS — I10 HYPERTENSION, UNSPECIFIED TYPE: ICD-10-CM

## 2024-06-21 RX ORDER — OLMESARTAN MEDOXOMIL 40 MG/1
40 TABLET ORAL DAILY
Qty: 90 TABLET | Refills: 1 | Status: SHIPPED | OUTPATIENT
Start: 2024-06-21

## 2024-07-08 ENCOUNTER — APPOINTMENT (OUTPATIENT)
Dept: PRIMARY CARE | Facility: CLINIC | Age: 74
End: 2024-07-08
Payer: MEDICARE

## 2024-07-15 ENCOUNTER — APPOINTMENT (OUTPATIENT)
Dept: PRIMARY CARE | Facility: CLINIC | Age: 74
End: 2024-07-15
Payer: MEDICARE

## 2024-07-15 VITALS
SYSTOLIC BLOOD PRESSURE: 122 MMHG | HEIGHT: 69 IN | BODY MASS INDEX: 28.73 KG/M2 | WEIGHT: 194 LBS | DIASTOLIC BLOOD PRESSURE: 68 MMHG

## 2024-07-15 DIAGNOSIS — F05 SUNDOWNING: ICD-10-CM

## 2024-07-15 DIAGNOSIS — F41.9 ANXIETY AND DEPRESSION: ICD-10-CM

## 2024-07-15 DIAGNOSIS — Z12.5 ENCOUNTER FOR PROSTATE CANCER SCREENING: Primary | ICD-10-CM

## 2024-07-15 DIAGNOSIS — E55.9 VITAMIN D DEFICIENCY: ICD-10-CM

## 2024-07-15 DIAGNOSIS — F43.10 PTSD (POST-TRAUMATIC STRESS DISORDER): ICD-10-CM

## 2024-07-15 DIAGNOSIS — F32.A ANXIETY AND DEPRESSION: ICD-10-CM

## 2024-07-15 DIAGNOSIS — I10 HYPERTENSION, UNSPECIFIED TYPE: ICD-10-CM

## 2024-07-15 DIAGNOSIS — E78.00 LOW-DENSITY-LIPOID-TYPE (LDL) HYPERLIPOPROTEINEMIA: ICD-10-CM

## 2024-07-15 DIAGNOSIS — G47.30 SLEEP APNEA, UNSPECIFIED TYPE: ICD-10-CM

## 2024-07-15 DIAGNOSIS — E03.9 HYPOTHYROIDISM, UNSPECIFIED TYPE: ICD-10-CM

## 2024-07-15 PROCEDURE — 3074F SYST BP LT 130 MM HG: CPT | Performed by: INTERNAL MEDICINE

## 2024-07-15 PROCEDURE — 3078F DIAST BP <80 MM HG: CPT | Performed by: INTERNAL MEDICINE

## 2024-07-15 PROCEDURE — 99214 OFFICE O/P EST MOD 30 MIN: CPT | Performed by: INTERNAL MEDICINE

## 2024-07-15 PROCEDURE — 1124F ACP DISCUSS-NO DSCNMKR DOCD: CPT | Performed by: INTERNAL MEDICINE

## 2024-07-15 PROCEDURE — 1159F MED LIST DOCD IN RCRD: CPT | Performed by: INTERNAL MEDICINE

## 2024-07-15 ASSESSMENT — ENCOUNTER SYMPTOMS
LOSS OF SENSATION IN FEET: 0
DEPRESSION: 0
OCCASIONAL FEELINGS OF UNSTEADINESS: 0

## 2024-07-16 ENCOUNTER — LAB (OUTPATIENT)
Dept: LAB | Facility: LAB | Age: 74
End: 2024-07-16
Payer: MEDICARE

## 2024-07-16 DIAGNOSIS — Z12.5 ENCOUNTER FOR PROSTATE CANCER SCREENING: ICD-10-CM

## 2024-07-16 DIAGNOSIS — I10 HYPERTENSION, UNSPECIFIED TYPE: ICD-10-CM

## 2024-07-16 DIAGNOSIS — E03.9 HYPOTHYROIDISM, UNSPECIFIED TYPE: ICD-10-CM

## 2024-07-16 DIAGNOSIS — E55.9 VITAMIN D DEFICIENCY: ICD-10-CM

## 2024-07-16 LAB
25(OH)D3 SERPL-MCNC: 30 NG/ML (ref 30–100)
ALBUMIN SERPL BCP-MCNC: 4.1 G/DL (ref 3.4–5)
ALP SERPL-CCNC: 50 U/L (ref 33–136)
ALT SERPL W P-5'-P-CCNC: 9 U/L (ref 10–52)
ANION GAP SERPL CALC-SCNC: 13 MMOL/L (ref 10–20)
AST SERPL W P-5'-P-CCNC: 13 U/L (ref 9–39)
BILIRUB SERPL-MCNC: 1.4 MG/DL (ref 0–1.2)
BUN SERPL-MCNC: 17 MG/DL (ref 6–23)
CALCIUM SERPL-MCNC: 9.5 MG/DL (ref 8.6–10.6)
CHLORIDE SERPL-SCNC: 108 MMOL/L (ref 98–107)
CHOLEST SERPL-MCNC: 148 MG/DL (ref 0–199)
CHOLESTEROL/HDL RATIO: 4.2
CO2 SERPL-SCNC: 25 MMOL/L (ref 21–32)
CREAT SERPL-MCNC: 1.09 MG/DL (ref 0.5–1.3)
EGFRCR SERPLBLD CKD-EPI 2021: 70 ML/MIN/1.73M*2
ERYTHROCYTE [DISTWIDTH] IN BLOOD BY AUTOMATED COUNT: 13.5 % (ref 11.5–14.5)
GLUCOSE SERPL-MCNC: 96 MG/DL (ref 74–99)
HCT VFR BLD AUTO: 46.9 % (ref 41–52)
HDLC SERPL-MCNC: 35.5 MG/DL
HGB BLD-MCNC: 15.8 G/DL (ref 13.5–17.5)
LDLC SERPL CALC-MCNC: 66 MG/DL
MCH RBC QN AUTO: 31.3 PG (ref 26–34)
MCHC RBC AUTO-ENTMCNC: 33.7 G/DL (ref 32–36)
MCV RBC AUTO: 93 FL (ref 80–100)
NON HDL CHOLESTEROL: 113 MG/DL (ref 0–149)
NRBC BLD-RTO: 0 /100 WBCS (ref 0–0)
PLATELET # BLD AUTO: 233 X10*3/UL (ref 150–450)
POTASSIUM SERPL-SCNC: 3.9 MMOL/L (ref 3.5–5.3)
PROT SERPL-MCNC: 6.5 G/DL (ref 6.4–8.2)
PSA SERPL-MCNC: 0.4 NG/ML
RBC # BLD AUTO: 5.04 X10*6/UL (ref 4.5–5.9)
SODIUM SERPL-SCNC: 142 MMOL/L (ref 136–145)
TRIGL SERPL-MCNC: 233 MG/DL (ref 0–149)
TSH SERPL-ACNC: 1.41 MIU/L (ref 0.44–3.98)
VLDL: 47 MG/DL (ref 0–40)
WBC # BLD AUTO: 7.3 X10*3/UL (ref 4.4–11.3)

## 2024-07-16 PROCEDURE — 36415 COLL VENOUS BLD VENIPUNCTURE: CPT

## 2024-07-16 PROCEDURE — G0103 PSA SCREENING: HCPCS

## 2024-07-16 PROCEDURE — 85027 COMPLETE CBC AUTOMATED: CPT

## 2024-07-16 PROCEDURE — 84443 ASSAY THYROID STIM HORMONE: CPT

## 2024-07-16 PROCEDURE — 80061 LIPID PANEL: CPT

## 2024-07-16 PROCEDURE — 80053 COMPREHEN METABOLIC PANEL: CPT

## 2024-07-16 PROCEDURE — 81001 URINALYSIS AUTO W/SCOPE: CPT

## 2024-07-16 PROCEDURE — 82306 VITAMIN D 25 HYDROXY: CPT

## 2024-07-17 LAB
APPEARANCE UR: ABNORMAL
BILIRUB UR STRIP.AUTO-MCNC: NEGATIVE MG/DL
COLOR UR: ABNORMAL
GLUCOSE UR STRIP.AUTO-MCNC: NORMAL MG/DL
KETONES UR STRIP.AUTO-MCNC: NEGATIVE MG/DL
LEUKOCYTE ESTERASE UR QL STRIP.AUTO: NEGATIVE
MUCOUS THREADS #/AREA URNS AUTO: NORMAL /LPF
NITRITE UR QL STRIP.AUTO: NEGATIVE
PH UR STRIP.AUTO: 5.5 [PH]
PROT UR STRIP.AUTO-MCNC: NEGATIVE MG/DL
RBC # UR STRIP.AUTO: ABNORMAL /UL
RBC #/AREA URNS AUTO: NORMAL /HPF
SP GR UR STRIP.AUTO: 1.02
UROBILINOGEN UR STRIP.AUTO-MCNC: NORMAL MG/DL
WBC #/AREA URNS AUTO: NORMAL /HPF

## 2024-07-17 NOTE — PROGRESS NOTES
"Subjective   Patient ID: Adithya Tirado is a 74 y.o. male who presents for Follow-up (on various conditions).    This gentleman today came here for follow-up on various conditions.  Overall, he is happy.  He told me that he got PTSD and sleep apnea, he is concerned.  He thinks ______ [facility].  Appetite and weight are okay.    I have personally reviewed the patient's Past Medical History, Medications, Allergies, Social History, and Family History in the EMR.    Review of Systems   All other systems reviewed and are negative.    Objective   /68   Ht 1.753 m (5' 9\")   Wt 88 kg (194 lb)   BMI 28.65 kg/m²     Physical Exam  Vitals reviewed.   Cardiovascular:      Heart sounds: Normal heart sounds, S1 normal and S2 normal. No murmur heard.     No friction rub.   Pulmonary:      Effort: Pulmonary effort is normal.      Breath sounds: Normal breath sounds and air entry.   Abdominal:      Palpations: There is no hepatomegaly, splenomegaly or mass.   Musculoskeletal:      Right lower leg: No edema.      Left lower leg: No edema.   Lymphadenopathy:      Lower Body: No right inguinal adenopathy. No left inguinal adenopathy.   Neurological:      Cranial Nerves: Cranial nerves 2-12 are intact.      Sensory: No sensory deficit.      Motor: Motor function is intact.      Deep Tendon Reflexes: Reflexes are normal and symmetric.     LAB WORK:  Laboratory testing discussed.    Assessment/Plan   Problem List Items Addressed This Visit             ICD-10-CM       Cardiac and Vasculature    Hypertension I10    Relevant Orders    Urinalysis with Reflex Microscopic    CBC (Completed)    Comprehensive Metabolic Panel (Completed)    Lipid Panel (Completed)    Vitamin D 25-Hydroxy,Total (for eval of Vitamin D levels) (Completed)    Thyroid Stimulating Hormone (Completed)    Low-density-lipoid-type (LDL) hyperlipoproteinemia E78.00       Endocrine/Metabolic    Vitamin D deficiency E55.9    Relevant Orders    Urinalysis with Reflex " Microscopic    CBC (Completed)    Comprehensive Metabolic Panel (Completed)    Lipid Panel (Completed)    Vitamin D 25-Hydroxy,Total (for eval of Vitamin D levels) (Completed)    Thyroid Stimulating Hormone (Completed)     Other Visit Diagnoses         Codes    Encounter for prostate cancer screening    -  Primary Z12.5    Relevant Orders    Prostate Specific Antigen, Screen (Completed)    Hypothyroidism, unspecified type     E03.9    Relevant Orders    Urinalysis with Reflex Microscopic    CBC (Completed)    Comprehensive Metabolic Panel (Completed)    Lipid Panel (Completed)    Vitamin D 25-Hydroxy,Total (for eval of Vitamin D levels) (Completed)    Thyroid Stimulating Hormone (Completed)    PTSD (post-traumatic stress disorder)     F43.10    Sleep apnea, unspecified type     G47.30    Anxiety and depression     F41.9, F32.A    Sundowning     F05        1. Hypertension, okay.  Kidney okay.  2. Cholesterol.  Monitor.  3. PTSD, under psychiatric care.  4. Sleep apnea.  I think sleep apnea and PTSD kind of related ______ symptoms.  5. Anxiety and depression, okay.  6. Sundowning stable.  7. I shall see him in a week after the test.  8. Continue to follow.    Scribe Attestation  By signing my name below, I, Niecy Randle, Scribe attest that this documentation has been prepared under the direction and in the presence of Mayela Barrett MD.

## 2024-07-21 DIAGNOSIS — I10 ESSENTIAL (PRIMARY) HYPERTENSION: ICD-10-CM

## 2024-07-22 RX ORDER — METOPROLOL SUCCINATE 50 MG/1
50 TABLET, EXTENDED RELEASE ORAL DAILY
Qty: 90 TABLET | Refills: 3 | Status: SHIPPED | OUTPATIENT
Start: 2024-07-22

## 2024-07-23 ENCOUNTER — APPOINTMENT (OUTPATIENT)
Dept: PRIMARY CARE | Facility: CLINIC | Age: 74
End: 2024-07-23
Payer: MEDICARE

## 2024-07-24 ENCOUNTER — OFFICE VISIT (OUTPATIENT)
Dept: PRIMARY CARE | Facility: CLINIC | Age: 74
End: 2024-07-24
Payer: MEDICARE

## 2024-07-24 VITALS
WEIGHT: 197 LBS | HEIGHT: 69 IN | DIASTOLIC BLOOD PRESSURE: 68 MMHG | SYSTOLIC BLOOD PRESSURE: 126 MMHG | BODY MASS INDEX: 29.18 KG/M2

## 2024-07-24 DIAGNOSIS — R07.9 CHEST PAIN, UNSPECIFIED TYPE: ICD-10-CM

## 2024-07-24 DIAGNOSIS — I10 HYPERTENSION, UNSPECIFIED TYPE: ICD-10-CM

## 2024-07-24 DIAGNOSIS — E78.00 HIGH CHOLESTEROL: ICD-10-CM

## 2024-07-24 DIAGNOSIS — E03.9 HYPOTHYROIDISM, UNSPECIFIED TYPE: ICD-10-CM

## 2024-07-24 DIAGNOSIS — G47.30 SLEEP APNEA, UNSPECIFIED TYPE: Primary | ICD-10-CM

## 2024-07-24 DIAGNOSIS — F43.10 PTSD (POST-TRAUMATIC STRESS DISORDER): ICD-10-CM

## 2024-07-24 DIAGNOSIS — Z12.5 ENCOUNTER FOR PROSTATE CANCER SCREENING: ICD-10-CM

## 2024-07-24 PROCEDURE — 99213 OFFICE O/P EST LOW 20 MIN: CPT | Performed by: INTERNAL MEDICINE

## 2024-07-24 PROCEDURE — 3074F SYST BP LT 130 MM HG: CPT | Performed by: INTERNAL MEDICINE

## 2024-07-24 PROCEDURE — 3078F DIAST BP <80 MM HG: CPT | Performed by: INTERNAL MEDICINE

## 2024-07-24 PROCEDURE — 1123F ACP DISCUSS/DSCN MKR DOCD: CPT | Performed by: INTERNAL MEDICINE

## 2024-07-24 PROCEDURE — 1159F MED LIST DOCD IN RCRD: CPT | Performed by: INTERNAL MEDICINE

## 2024-07-24 RX ORDER — OMEPRAZOLE 40 MG/1
40 CAPSULE, DELAYED RELEASE ORAL
Qty: 90 CAPSULE | Refills: 0 | Status: SHIPPED | OUTPATIENT
Start: 2024-07-24 | End: 2024-10-22

## 2024-07-24 NOTE — PROGRESS NOTES
"Subjective   Patient ID: Adithya Tirado is a 74 y.o. male who presents for Follow-up and Results.    This gentleman today came here for follow-up on various conditions.  He had blood work taken.  Appetite and weight are okay.  No problem.  He lives by himself.    I have personally reviewed the patient's Past Medical History, Medications, Allergies, Social History, and Family History in the EMR.    Review of Systems   All other systems reviewed and are negative.    Objective   /68   Ht 1.753 m (5' 9\")   Wt 89.4 kg (197 lb)   BMI 29.09 kg/m²     Physical Exam  Vitals reviewed.   Cardiovascular:      Heart sounds: Normal heart sounds, S1 normal and S2 normal. No murmur heard.     No friction rub.   Pulmonary:      Effort: Pulmonary effort is normal.      Breath sounds: Normal breath sounds and air entry.   Abdominal:      Palpations: There is no hepatomegaly, splenomegaly or mass.   Musculoskeletal:      Right lower leg: No edema.      Left lower leg: No edema.   Lymphadenopathy:      Lower Body: No right inguinal adenopathy. No left inguinal adenopathy.   Neurological:      Cranial Nerves: Cranial nerves 2-12 are intact.      Sensory: No sensory deficit.      Motor: Motor function is intact.      Deep Tendon Reflexes: Reflexes are normal and symmetric.     LAB WORK: Laboratory testing discussed.    Assessment/Plan   Problem List Items Addressed This Visit             ICD-10-CM       Cardiac and Vasculature    Hypertension I10    Chest pain R07.9    Relevant Medications    omeprazole (PriLOSEC) 40 mg DR capsule     Other Visit Diagnoses         Codes    Sleep apnea, unspecified type    -  Primary G47.30    PTSD (post-traumatic stress disorder)     F43.10    High cholesterol     E78.00    Hypothyroidism, unspecified type     E03.9    Encounter for prostate cancer screening     Z12.5        1. Sleep apnea.  The patient is taking sleep mask, okay.  2. PTSD.  [facility] is under [facility].  3. Hypertension, okay.  4. High " cholesterol, stable.  5. Thyroid.  Monitor.  6. Prostate health, good.  7. I shall see him back in two moths with repeat tests.  8. Continue to follow.    Scribe Attestation  By signing my name below, I, Buddy Jones attest that this documentation has been prepared under the direction and in the presence of Mayela Barrett MD.

## 2024-07-26 ENCOUNTER — OFFICE VISIT (OUTPATIENT)
Dept: PRIMARY CARE | Facility: CLINIC | Age: 74
End: 2024-07-26
Payer: MEDICARE

## 2024-07-26 VITALS
WEIGHT: 196 LBS | DIASTOLIC BLOOD PRESSURE: 72 MMHG | HEIGHT: 69 IN | BODY MASS INDEX: 29.03 KG/M2 | SYSTOLIC BLOOD PRESSURE: 130 MMHG

## 2024-07-26 DIAGNOSIS — R31.9 HEMATURIA, UNSPECIFIED TYPE: ICD-10-CM

## 2024-07-26 DIAGNOSIS — K21.9 GASTROESOPHAGEAL REFLUX DISEASE, UNSPECIFIED WHETHER ESOPHAGITIS PRESENT: ICD-10-CM

## 2024-07-26 DIAGNOSIS — E78.00 HIGH CHOLESTEROL: ICD-10-CM

## 2024-07-26 DIAGNOSIS — I10 HYPERTENSION, UNSPECIFIED TYPE: ICD-10-CM

## 2024-07-26 DIAGNOSIS — Z00.00 MEDICARE ANNUAL WELLNESS VISIT, INITIAL: Primary | ICD-10-CM

## 2024-07-26 DIAGNOSIS — G47.30 SLEEP APNEA, UNSPECIFIED TYPE: ICD-10-CM

## 2024-07-26 DIAGNOSIS — F41.9 ANXIETY: ICD-10-CM

## 2024-07-26 PROCEDURE — 1123F ACP DISCUSS/DSCN MKR DOCD: CPT | Performed by: INTERNAL MEDICINE

## 2024-07-26 PROCEDURE — 99213 OFFICE O/P EST LOW 20 MIN: CPT | Performed by: INTERNAL MEDICINE

## 2024-07-26 PROCEDURE — 1159F MED LIST DOCD IN RCRD: CPT | Performed by: INTERNAL MEDICINE

## 2024-07-26 PROCEDURE — 1160F RVW MEDS BY RX/DR IN RCRD: CPT | Performed by: INTERNAL MEDICINE

## 2024-07-26 PROCEDURE — 3075F SYST BP GE 130 - 139MM HG: CPT | Performed by: INTERNAL MEDICINE

## 2024-07-26 PROCEDURE — G0439 PPPS, SUBSEQ VISIT: HCPCS | Performed by: INTERNAL MEDICINE

## 2024-07-26 PROCEDURE — 3078F DIAST BP <80 MM HG: CPT | Performed by: INTERNAL MEDICINE

## 2024-07-26 PROCEDURE — 1170F FXNL STATUS ASSESSED: CPT | Performed by: INTERNAL MEDICINE

## 2024-07-26 ASSESSMENT — ENCOUNTER SYMPTOMS
DEPRESSION: 0
LOSS OF SENSATION IN FEET: 0
OCCASIONAL FEELINGS OF UNSTEADINESS: 0

## 2024-07-26 ASSESSMENT — PATIENT HEALTH QUESTIONNAIRE - PHQ9
1. LITTLE INTEREST OR PLEASURE IN DOING THINGS: NOT AT ALL
SUM OF ALL RESPONSES TO PHQ9 QUESTIONS 1 AND 2: 0
2. FEELING DOWN, DEPRESSED OR HOPELESS: NOT AT ALL

## 2024-07-26 ASSESSMENT — ACTIVITIES OF DAILY LIVING (ADL)
BATHING: INDEPENDENT
DRESSING: INDEPENDENT

## 2024-07-27 NOTE — PROGRESS NOTES
"Subjective   Patient ID: Adithya Tirado is a 74 y.o. male who presents for Medicare Annual Wellness Visit Initial.    Mr. Tirado today came here for Medicare Wellness Visit and follow-up on blood work.  Overall, he is okay.  He lives by himself.  [facility] is [facility] with [facility].    IMMUNIZATION: Up-to-date.    I have personally reviewed the patient's Past Medical History, Medications, Allergies, Social History, and Family History in the EMR.    Review of Systems   All other systems reviewed and are negative.  No stroke.  No heart attack.  No diabetes.  No cancer.    Objective   /72   Ht 1.753 m (5' 9\")   Wt 88.9 kg (196 lb)   BMI 28.94 kg/m²     Physical Exam  Vitals reviewed.   HENT:      Right Ear: Tympanic membrane, ear canal and external ear normal.      Left Ear: Tympanic membrane, ear canal and external ear normal.   Eyes:      General: No scleral icterus.     Pupils: Pupils are equal, round, and reactive to light.   Neck:      Vascular: No carotid bruit.   Cardiovascular:      Heart sounds: Normal heart sounds, S1 normal and S2 normal. No murmur heard.     No friction rub.   Pulmonary:      Effort: Pulmonary effort is normal.      Breath sounds: Normal breath sounds and air entry.   Abdominal:      Palpations: There is no hepatomegaly, splenomegaly or mass.   Genitourinary:     Prostate: Normal.   Musculoskeletal:         General: No swelling or deformity. Normal range of motion.      Cervical back: Neck supple.      Right lower leg: No edema.      Left lower leg: No edema.   Lymphadenopathy:      Cervical: No cervical adenopathy.      Upper Body:      Right upper body: No axillary adenopathy.      Left upper body: No axillary adenopathy.      Lower Body: No right inguinal adenopathy. No left inguinal adenopathy.   Neurological:      Mental Status: He is oriented to person, place, and time.      Cranial Nerves: Cranial nerves 2-12 are intact. No cranial nerve deficit.      Sensory: No sensory " deficit.      Motor: Motor function is intact. No weakness.      Gait: Gait is intact.      Deep Tendon Reflexes: Reflexes normal.   Psychiatric:         Mood and Affect: Mood normal. Mood is not anxious or depressed. Affect is not angry.         Behavior: Behavior is not agitated.         Thought Content: Thought content normal.         Judgment: Judgment normal.     LAB WORK: Laboratory testing done discussed.    Assessment/Plan   Problem List Items Addressed This Visit             ICD-10-CM       Cardiac and Vasculature    Hypertension I10       Mental Health    Anxiety F41.9     Other Visit Diagnoses         Codes    Medicare annual wellness visit, initial    -  Primary Z00.00    Hematuria, unspecified type     R31.9    Relevant Orders    US renal complete    High cholesterol     E78.00    Gastroesophageal reflux disease, unspecified whether esophagitis present     K21.9    Sleep apnea, unspecified type     G47.30        1. Medicare Wellness Visit done.  2. The patient is full code.  The patient’s family member is legal power of .  3. The patient is not depressed, not suicidal.  4. Hypertension, okay.  5. High cholesterol, stable.  6. GERD, on PPI.  7. Anxiety, PTSD, under treatment.  8. Sleep apnea, on medication.  9. Blood in urine.  Ultrasound of kidney and bladder ordered.  Blood work reviewed.  10. Immunization, okay.  11. I shall see him back in a week after the tests.  12. I filled out the paperwork.    Scribe Attestation  By signing my name below, I, Niecy Randle, Scribe attest that this documentation has been prepared under the direction and in the presence of Mayela Barrett MD.

## 2024-07-30 ENCOUNTER — TELEPHONE (OUTPATIENT)
Dept: CARDIOLOGY | Facility: CLINIC | Age: 74
End: 2024-07-30

## 2024-07-30 ENCOUNTER — OFFICE VISIT (OUTPATIENT)
Dept: CARDIOLOGY | Facility: CLINIC | Age: 74
End: 2024-07-30
Payer: MEDICARE

## 2024-07-30 VITALS
WEIGHT: 196.6 LBS | HEIGHT: 69 IN | RESPIRATION RATE: 16 BRPM | OXYGEN SATURATION: 97 % | SYSTOLIC BLOOD PRESSURE: 146 MMHG | BODY MASS INDEX: 29.12 KG/M2 | HEART RATE: 63 BPM | DIASTOLIC BLOOD PRESSURE: 74 MMHG

## 2024-07-30 DIAGNOSIS — I10 PRIMARY HYPERTENSION: Primary | ICD-10-CM

## 2024-07-30 PROCEDURE — 1126F AMNT PAIN NOTED NONE PRSNT: CPT | Performed by: NURSE PRACTITIONER

## 2024-07-30 PROCEDURE — 1123F ACP DISCUSS/DSCN MKR DOCD: CPT | Performed by: NURSE PRACTITIONER

## 2024-07-30 PROCEDURE — 1036F TOBACCO NON-USER: CPT | Performed by: NURSE PRACTITIONER

## 2024-07-30 PROCEDURE — 99214 OFFICE O/P EST MOD 30 MIN: CPT | Performed by: NURSE PRACTITIONER

## 2024-07-30 PROCEDURE — 3077F SYST BP >= 140 MM HG: CPT | Performed by: NURSE PRACTITIONER

## 2024-07-30 PROCEDURE — 1159F MED LIST DOCD IN RCRD: CPT | Performed by: NURSE PRACTITIONER

## 2024-07-30 PROCEDURE — 3078F DIAST BP <80 MM HG: CPT | Performed by: NURSE PRACTITIONER

## 2024-07-30 PROCEDURE — 1160F RVW MEDS BY RX/DR IN RCRD: CPT | Performed by: NURSE PRACTITIONER

## 2024-07-30 RX ORDER — ROSUVASTATIN CALCIUM 40 MG/1
40 TABLET, COATED ORAL DAILY
COMMUNITY

## 2024-07-30 ASSESSMENT — PATIENT HEALTH QUESTIONNAIRE - PHQ9
2. FEELING DOWN, DEPRESSED OR HOPELESS: NOT AT ALL
1. LITTLE INTEREST OR PLEASURE IN DOING THINGS: NOT AT ALL
SUM OF ALL RESPONSES TO PHQ9 QUESTIONS 1 AND 2: 0

## 2024-07-30 ASSESSMENT — COLUMBIA-SUICIDE SEVERITY RATING SCALE - C-SSRS
2. HAVE YOU ACTUALLY HAD ANY THOUGHTS OF KILLING YOURSELF?: NO
1. IN THE PAST MONTH, HAVE YOU WISHED YOU WERE DEAD OR WISHED YOU COULD GO TO SLEEP AND NOT WAKE UP?: NO
6. HAVE YOU EVER DONE ANYTHING, STARTED TO DO ANYTHING, OR PREPARED TO DO ANYTHING TO END YOUR LIFE?: NO

## 2024-07-30 ASSESSMENT — ENCOUNTER SYMPTOMS
RESPIRATORY NEGATIVE: 1
MUSCULOSKELETAL NEGATIVE: 1
CARDIOVASCULAR NEGATIVE: 1
NEUROLOGICAL NEGATIVE: 1
GASTROINTESTINAL NEGATIVE: 1

## 2024-07-30 ASSESSMENT — PAIN SCALES - GENERAL: PAINLEVEL: 0-NO PAIN

## 2024-07-30 NOTE — PROGRESS NOTES
"Follow up    History Of Present Illness:    .Mr Tirado returns in follow up.  Denies chest pain, sob, palpitations or pedal edema.  He complains of fatigue.  Does not know what meds he takes and has been provided a list and will call to confirm.          Last Recorded Vitals:  Blood pressure 146/74, pulse 63, resp. rate 16, height 1.753 m (5' 9\"), weight 89.2 kg (196 lb 9.6 oz), SpO2 97%.     Past Medical History:  Past Medical History:   Diagnosis Date    Anxiety     Arthritis     BPH (benign prostatic hyperplasia)     Depression     High cholesterol     Hypertension     PTSD (post-traumatic stress disorder)     Sleep apnea         Past Surgical History:  Past Surgical History:   Procedure Laterality Date    MR HEAD ANGIO WO IV CONTRAST  3/22/2022    MR HEAD ANGIO WO IV CONTRAST 3/22/2022 [facility]    OTHER SURGICAL HISTORY  09/19/2013    Ear Surgery    OTHER SURGICAL HISTORY  10/27/2021    Thyroid surgery       Social History:  Social History     Socioeconomic History    Marital status: Single   Occupational History    Occupation: retired.   Tobacco Use    Smoking status: Former     Types: Cigarettes    Smokeless tobacco: Never   Substance and Sexual Activity    Alcohol use: Never    Drug use: Never       Family History:  Family History   Problem Relation Name Age of Onset    Heart disease Other      Hypertension Other           Allergies:  Patient has no known allergies.    Outpatient Medications:  Current Outpatient Medications   Medication Sig Dispense Refill    amLODIPine (Norvasc) 5 mg tablet Take 1 tablet (5 mg) by mouth once daily. 90 tablet 1    escitalopram (Lexapro) 20 mg tablet TAKE 1 TABLET BY MOUTH EVERY DAY 90 tablet 0    haloperidol (Haldol) 0.5 mg tablet TAKE 1 TABLET (0.5 MG) BY MOUTH 2 TIMES A DAY. 180 tablet 0    ibuprofen 600 mg tablet Take 1 tablet (600 mg) by mouth 3 times a day as needed.      Klor-Con M20 20 mEq ER tablet TAKE 1 TABLET (20 MEQ) BY MOUTH ONCE DAILY. DO NOT CRUSH " OR CHEW. 90 tablet 1    metoprolol succinate XL (Toprol-XL) 50 mg 24 hr tablet TAKE 1 TABLET DAILY 90 tablet 3    nabumetone (Relafen) 750 mg tablet Take 1 tablet (750 mg) by mouth every 12 hours. 60 tablet 5    olmesartan (BENIcar) 40 mg tablet TAKE 1 TABLET BY MOUTH EVERY DAY 90 tablet 1    omeprazole (PriLOSEC) 40 mg DR capsule Take 1 capsule (40 mg) by mouth once daily in the morning. Take before meals. Do not crush or chew. 90 capsule 0    rosuvastatin (Crestor) 10 mg tablet TAKE 1 TABLET BY MOUTH EVERY DAY (Patient taking differently: Take 4 tablets (40 mg) by mouth once daily.) 90 tablet 0    melatonin 10 mg capsule Take by mouth.      oxyCODONE (Roxicodone) 5 mg immediate release tablet Take 1 tablet (5 mg) by mouth.       No current facility-administered medications for this visit.        Physical Exam:  Cardiovascular:      PMI at left midclavicular line. Normal rate. Regular rhythm. Normal S1. Normal S2.       Murmurs: There is no murmur.      No gallop.  No click. No rub.   Pulses:     Intact distal pulses.   Edema:     Peripheral edema absent.         ROS:  Review of Systems   Constitutional: Positive for malaise/fatigue.   Cardiovascular: Negative.    Respiratory: Negative.     Skin: Negative.    Musculoskeletal: Negative.    Gastrointestinal: Negative.    Genitourinary: Negative.    Neurological: Negative.           Last Labs:  CBC -  Lab Results   Component Value Date    WBC 7.3 07/16/2024    HGB 15.8 07/16/2024    HCT 46.9 07/16/2024    MCV 93 07/16/2024     07/16/2024       CMP -  Lab Results   Component Value Date    CALCIUM 9.5 07/16/2024    PROT 6.5 07/16/2024    ALBUMIN 4.1 07/16/2024    AST 13 07/16/2024    ALT 9 (L) 07/16/2024    ALKPHOS 50 07/16/2024    BILITOT 1.4 (H) 07/16/2024       LIPID PANEL -   Lab Results   Component Value Date    CHOL 148 07/16/2024    TRIG 233 (H) 07/16/2024    HDL 35.5 07/16/2024    CHHDL 4.2 07/16/2024    LDLF 88 06/08/2023    VLDL 47 (H) 07/16/2024     NHDL 113 07/16/2024       RENAL FUNCTION PANEL -   Lab Results   Component Value Date    GLUCOSE 96 07/16/2024     07/16/2024    K 3.9 07/16/2024     (H) 07/16/2024    CO2 25 07/16/2024    ANIONGAP 13 07/16/2024    BUN 17 07/16/2024    CREATININE 1.09 07/16/2024    GFRMALE 79 06/08/2023    CALCIUM 9.5 07/16/2024    ALBUMIN 4.1 07/16/2024        Lab Results   Component Value Date    BNP 55 12/15/2023         Assessment/Plan   Problem List Items Addressed This Visit    None   Hypertension.  Will omit hydrochlorothiazide from the olmesartan.  The patient's HCTZ as noted was discontinued last visit.  Patient is currently on olmesartan 40 mg daily only.  Repeat lab work from 12/28/2023 included an improvement in the creatinine to 1.19 with a normal CBC.  Somewhat unexpectedly potassium remains only 3.2.  He will remain on a potassium supplement.  Blood pressure is slightly elevated today however and will begin amlodipine 5 mg daily.  Syncope.      COTY.  Cr lucia to 1.57 during hospital stay 12/2023. The patient was given IV fluids.  CT shows no acute intracranial process. Cr did improve and will recheck today.  As noted above the patient's creatinine improved to 1.19 on 12/28/2023 with the discontinuance of HCTZ.  Hyperlipidemia.  Patient's lipid panel 12/28/2023 included cholesterol 157 LDL 64 HDL 36 triglyceride 285.  Continue the rosuvastatin 10 mg daily.  5.  CAD.  Coronary artery calcium score was 87.23 when done 12/2023.  Stress echo 07/2020  Summary:   1. No clinical, echocardiographic or electrocardiographic evidence for ischemia at a maximal workload.   2. No increased arrhythmias note with exercise .   3. The adequate level of stress was achieved.   4. The resting ejection fraction was estimated at 60 to 65% with a peak exercise ejection fraction estimated at 70 to 75%.  The patient remains asymptomatic without any concerning chest discomfort.   6. LES. On cpap with oxygen.        Xiomara Lainez,  APRN-CNP

## 2024-07-31 ENCOUNTER — HOSPITAL ENCOUNTER (OUTPATIENT)
Dept: RADIOLOGY | Facility: CLINIC | Age: 74
Discharge: HOME | End: 2024-07-31
Payer: MEDICARE

## 2024-07-31 DIAGNOSIS — R31.9 HEMATURIA, UNSPECIFIED TYPE: ICD-10-CM

## 2024-07-31 PROCEDURE — 76770 US EXAM ABDO BACK WALL COMP: CPT | Performed by: RADIOLOGY

## 2024-07-31 PROCEDURE — 76770 US EXAM ABDO BACK WALL COMP: CPT

## 2024-08-21 ENCOUNTER — OFFICE VISIT (OUTPATIENT)
Dept: PRIMARY CARE | Facility: CLINIC | Age: 74
End: 2024-08-21
Payer: MEDICARE

## 2024-08-21 VITALS
WEIGHT: 193 LBS | BODY MASS INDEX: 28.58 KG/M2 | DIASTOLIC BLOOD PRESSURE: 74 MMHG | SYSTOLIC BLOOD PRESSURE: 134 MMHG | HEIGHT: 69 IN

## 2024-08-21 DIAGNOSIS — D22.9 BENIGN MOLE: ICD-10-CM

## 2024-08-21 DIAGNOSIS — L81.2 FRECKLES: ICD-10-CM

## 2024-08-21 DIAGNOSIS — I10 HYPERTENSION, UNSPECIFIED TYPE: ICD-10-CM

## 2024-08-21 DIAGNOSIS — E78.00 HIGH CHOLESTEROL: ICD-10-CM

## 2024-08-21 DIAGNOSIS — E03.9 HYPOTHYROIDISM, UNSPECIFIED TYPE: ICD-10-CM

## 2024-08-21 DIAGNOSIS — K21.9 GASTROESOPHAGEAL REFLUX DISEASE, UNSPECIFIED WHETHER ESOPHAGITIS PRESENT: Primary | ICD-10-CM

## 2024-08-21 DIAGNOSIS — L98.9 SKIN ABNORMALITIES: ICD-10-CM

## 2024-08-21 DIAGNOSIS — F32.A ANXIETY AND DEPRESSION: ICD-10-CM

## 2024-08-21 DIAGNOSIS — Z12.5 PROSTATE CANCER SCREENING: ICD-10-CM

## 2024-08-21 DIAGNOSIS — F41.9 ANXIETY AND DEPRESSION: ICD-10-CM

## 2024-08-21 PROCEDURE — 1123F ACP DISCUSS/DSCN MKR DOCD: CPT | Performed by: INTERNAL MEDICINE

## 2024-08-21 PROCEDURE — 3078F DIAST BP <80 MM HG: CPT | Performed by: INTERNAL MEDICINE

## 2024-08-21 PROCEDURE — 3075F SYST BP GE 130 - 139MM HG: CPT | Performed by: INTERNAL MEDICINE

## 2024-08-21 PROCEDURE — 99214 OFFICE O/P EST MOD 30 MIN: CPT | Performed by: INTERNAL MEDICINE

## 2024-08-21 PROCEDURE — 1159F MED LIST DOCD IN RCRD: CPT | Performed by: INTERNAL MEDICINE

## 2024-08-22 NOTE — PROGRESS NOTES
"Subjective   Patient ID: Adithya Tirado is a 74 y.o. male who presents for Follow-up and Results.    It is always a delight to serve Mr. Adithya Tirado.  Overall, he is keeping well.  [facility]  He is [facility] for ______.  No chest pain or shortness of breath.  He came for follow-up on various conditions.    I have personally reviewed the patient's Past Medical History, Medications, Allergies, Social History, and Family History in the EMR.    Review of Systems   All other systems reviewed and are negative.    Objective   /74   Ht 1.753 m (5' 9\")   Wt 87.5 kg (193 lb)   BMI 28.50 kg/m²     Physical Exam  Vitals reviewed.   Cardiovascular:      Heart sounds: Normal heart sounds, S1 normal and S2 normal. No murmur heard.     No friction rub.   Pulmonary:      Effort: Pulmonary effort is normal.      Breath sounds: Normal breath sounds and air entry.   Abdominal:      Palpations: There is no hepatomegaly, splenomegaly or mass.   Musculoskeletal:      Right lower leg: No edema.      Left lower leg: No edema.   Lymphadenopathy:      Lower Body: No right inguinal adenopathy. No left inguinal adenopathy.   Neurological:      Cranial Nerves: Cranial nerves 2-12 are intact.      Sensory: No sensory deficit.      Motor: Motor function is intact.      Deep Tendon Reflexes: Reflexes are normal and symmetric.     LAB WORK: Laboratory testing discussed.    Assessment/Plan   Problem List Items Addressed This Visit             ICD-10-CM       Cardiac and Vasculature    Hypertension I10    Relevant Orders    CBC    Comprehensive Metabolic Panel    Thyroid Stimulating Hormone    Urinalysis with Reflex Microscopic     Other Visit Diagnoses         Codes    Gastroesophageal reflux disease, unspecified whether esophagitis present    -  Primary K21.9    High cholesterol     E78.00    Relevant Orders    CBC    Comprehensive Metabolic Panel    Thyroid Stimulating Hormone    Urinalysis with Reflex Microscopic "    Hypothyroidism, unspecified type     E03.9    Relevant Orders    CBC    Comprehensive Metabolic Panel    Thyroid Stimulating Hormone    Urinalysis with Reflex Microscopic    Skin abnormalities     L98.9    Relevant Orders    Referral to Dermatology    Anxiety and depression     F41.9, F32.A    Benign mole     D22.9    Freckles     L81.2    Prostate cancer screening     Z12.5        1. Hypertension.  Kidney okay.  2. Cholesterol.  Monitor.  3. GERD, on PPI.  4. Anxiety and depression, stable.  5. Skin, moles and freckles a couple of them caught my eye on his cheek and forehead.  I referred to  skin team for biopsy and full body checkup.  6. Prostate health, okay.  7. Follow-up with me in two months.  Happy to see him anytime sooner if necessary.    Scribe Attestation  By signing my name below, INiecy Scribe attest that this documentation has been prepared under the direction and in the presence of Mayela Barrett MD.    Patient Needs Assistance to Leave Residence...

## 2024-09-01 DIAGNOSIS — I10 HYPERTENSION, UNSPECIFIED TYPE: ICD-10-CM

## 2024-09-03 RX ORDER — AMLODIPINE BESYLATE 5 MG/1
5 TABLET ORAL DAILY
Qty: 90 TABLET | Refills: 1 | Status: SHIPPED | OUTPATIENT
Start: 2024-09-03

## 2024-09-13 DIAGNOSIS — E78.00 PURE HYPERCHOLESTEROLEMIA, UNSPECIFIED: ICD-10-CM

## 2024-09-13 RX ORDER — ROSUVASTATIN CALCIUM 40 MG/1
40 TABLET, COATED ORAL DAILY
Qty: 90 TABLET | Refills: 3 | Status: SHIPPED | OUTPATIENT
Start: 2024-09-13

## 2024-11-25 DIAGNOSIS — I10 HYPERTENSION, UNSPECIFIED TYPE: ICD-10-CM

## 2024-11-26 RX ORDER — POTASSIUM CHLORIDE 1500 MG/1
20 TABLET, EXTENDED RELEASE ORAL DAILY
Qty: 90 TABLET | Refills: 1 | Status: SHIPPED | OUTPATIENT
Start: 2024-11-26

## 2024-11-26 RX ORDER — OLMESARTAN MEDOXOMIL 40 MG/1
40 TABLET ORAL DAILY
Qty: 90 TABLET | Refills: 1 | Status: SHIPPED | OUTPATIENT
Start: 2024-11-26

## 2025-01-07 ENCOUNTER — HOSPITAL ENCOUNTER (EMERGENCY)
Facility: HOSPITAL | Age: 75
Discharge: HOME | End: 2025-01-07
Attending: EMERGENCY MEDICINE
Payer: MEDICARE

## 2025-01-07 ENCOUNTER — APPOINTMENT (OUTPATIENT)
Dept: RADIOLOGY | Facility: HOSPITAL | Age: 75
End: 2025-01-07
Payer: MEDICARE

## 2025-01-07 VITALS
BODY MASS INDEX: 28.58 KG/M2 | WEIGHT: 193 LBS | SYSTOLIC BLOOD PRESSURE: 172 MMHG | TEMPERATURE: 99.1 F | HEART RATE: 95 BPM | HEIGHT: 69 IN | DIASTOLIC BLOOD PRESSURE: 79 MMHG | RESPIRATION RATE: 15 BRPM | OXYGEN SATURATION: 95 %

## 2025-01-07 DIAGNOSIS — M54.42 ACUTE LEFT-SIDED LOW BACK PAIN WITH LEFT-SIDED SCIATICA: Primary | ICD-10-CM

## 2025-01-07 DIAGNOSIS — M51.362 DEGENERATION OF INTERVERTEBRAL DISC OF LUMBAR REGION WITH DISCOGENIC BACK PAIN AND LOWER EXTREMITY PAIN: ICD-10-CM

## 2025-01-07 DIAGNOSIS — M89.9 BONE LESION: ICD-10-CM

## 2025-01-07 PROCEDURE — 2500000001 HC RX 250 WO HCPCS SELF ADMINISTERED DRUGS (ALT 637 FOR MEDICARE OP): Performed by: EMERGENCY MEDICINE

## 2025-01-07 PROCEDURE — 73552 X-RAY EXAM OF FEMUR 2/>: CPT | Mod: LEFT SIDE | Performed by: RADIOLOGY

## 2025-01-07 PROCEDURE — 72100 X-RAY EXAM L-S SPINE 2/3 VWS: CPT

## 2025-01-07 PROCEDURE — 99284 EMERGENCY DEPT VISIT MOD MDM: CPT | Mod: 25 | Performed by: EMERGENCY MEDICINE

## 2025-01-07 PROCEDURE — 72100 X-RAY EXAM L-S SPINE 2/3 VWS: CPT | Performed by: RADIOLOGY

## 2025-01-07 PROCEDURE — 73552 X-RAY EXAM OF FEMUR 2/>: CPT | Mod: LT

## 2025-01-07 PROCEDURE — 2500000004 HC RX 250 GENERAL PHARMACY W/ HCPCS (ALT 636 FOR OP/ED): Performed by: EMERGENCY MEDICINE

## 2025-01-07 RX ORDER — METHYLPREDNISOLONE 4 MG/1
TABLET ORAL
Qty: 21 TABLET | Refills: 0 | Status: SHIPPED | OUTPATIENT
Start: 2025-01-07 | End: 2025-01-14

## 2025-01-07 RX ORDER — PREDNISONE 20 MG/1
60 TABLET ORAL ONCE
Status: COMPLETED | OUTPATIENT
Start: 2025-01-07 | End: 2025-01-07

## 2025-01-07 RX ORDER — LIDOCAINE 50 MG/G
1 PATCH TOPICAL DAILY
Qty: 10 PATCH | Refills: 0 | Status: SHIPPED | OUTPATIENT
Start: 2025-01-07 | End: 2025-01-17

## 2025-01-07 RX ORDER — TRAMADOL HYDROCHLORIDE 50 MG/1
50 TABLET ORAL EVERY 6 HOURS PRN
Qty: 10 TABLET | Refills: 0 | Status: SHIPPED | OUTPATIENT
Start: 2025-01-07 | End: 2025-01-10

## 2025-01-07 RX ORDER — TRAMADOL HYDROCHLORIDE 50 MG/1
50 TABLET ORAL EVERY 8 HOURS PRN
Status: DISCONTINUED | OUTPATIENT
Start: 2025-01-07 | End: 2025-01-07 | Stop reason: HOSPADM

## 2025-01-07 RX ORDER — METHOCARBAMOL 500 MG/1
500 TABLET, FILM COATED ORAL 2 TIMES DAILY
Qty: 20 TABLET | Refills: 0 | Status: SHIPPED | OUTPATIENT
Start: 2025-01-07 | End: 2025-01-17

## 2025-01-07 RX ORDER — ACETAMINOPHEN 325 MG/1
650 TABLET ORAL EVERY 6 HOURS PRN
Qty: 30 TABLET | Refills: 0 | Status: SHIPPED | OUTPATIENT
Start: 2025-01-07 | End: 2025-01-17

## 2025-01-07 RX ORDER — IBUPROFEN 400 MG/1
400 TABLET ORAL ONCE
Status: COMPLETED | OUTPATIENT
Start: 2025-01-07 | End: 2025-01-07

## 2025-01-07 RX ADMIN — IBUPROFEN 400 MG: 400 TABLET, FILM COATED ORAL at 18:45

## 2025-01-07 RX ADMIN — PREDNISONE 60 MG: 20 TABLET ORAL at 18:45

## 2025-01-07 ASSESSMENT — PAIN DESCRIPTION - ORIENTATION: ORIENTATION: LEFT

## 2025-01-07 ASSESSMENT — PAIN - FUNCTIONAL ASSESSMENT: PAIN_FUNCTIONAL_ASSESSMENT: 0-10

## 2025-01-07 ASSESSMENT — LIFESTYLE VARIABLES
EVER HAD A DRINK FIRST THING IN THE MORNING TO STEADY YOUR NERVES TO GET RID OF A HANGOVER: NO
HAVE PEOPLE ANNOYED YOU BY CRITICIZING YOUR DRINKING: NO
HAVE YOU EVER FELT YOU SHOULD CUT DOWN ON YOUR DRINKING: NO
TOTAL SCORE: 0
EVER FELT BAD OR GUILTY ABOUT YOUR DRINKING: NO

## 2025-01-07 ASSESSMENT — PAIN DESCRIPTION - LOCATION: LOCATION: LEG

## 2025-01-07 ASSESSMENT — PAIN SCALES - GENERAL: PAINLEVEL_OUTOF10: 3

## 2025-01-07 NOTE — ED TRIAGE NOTES
Patient reports left leg pain since Saturday night, no injury, worse with activity.  Patient states pain is in the left buttock, left thigh top and bottom and knee pain.

## 2025-01-07 NOTE — PROGRESS NOTES
Attestation/Supervisory note for CORKY Glass      The patient is a 74-year-old male presenting to the emergency department for evaluation of left hip/thigh pain for the past 3 to 4 days.  He denies any recent injury or trauma.  He believes that he has sciatica.  He states that it is painful when he is walking or standing.  He states that that it is a dull aching pain.  No radiation.  He denies any headache or visual changes.  No focal midline neck or back pain.  No fever or chills.  No focal weakness or numbness.  No bowel or bladder incontinence.  No urinary retention.  No history of IV drug use.  No history of malignancy.  No chest pain or shortness of breath.  No abdominal pain.  No nausea vomiting.  No diarrhea or constipation.  No urinary complaints.  All pertinent positives and negatives are recorded above.  All other systems reviewed and otherwise negative.  Vital signs with hypertension but otherwise within normal limits.  Physical exam with a well-nourished well-developed male in no acute distress.  HEENT exam within normal limits.  He has no evidence of airway compromise or respiratory distress.  Abdominal exam is benign.  He does not have any focal midline neck or back pain with palpation.  No step-offs.  Strength is 5 of 5 in all 4 extremities.  Sensation is intact.  Reflexes are intact.  He does not have any visible or palpable bony deformity.  He does not have any focal midline neck or back pain with palpation.  No step-offs.      Oral ibuprofen and oral prednisone ordered      XR lumbar spine 2-3 views   Final Result   1. Moderate L5-S1 degenerative changes.   2. Additional findings as above.   3. 13 mm calcific density projecting in the region of the right   kidney lower pole likely representing a nephrolith.        MACRO:   None.        Signed by: Marry Young 1/7/2025 7:58 PM   Dictation workstation:   [id number]      XR femur left 2+ views   Final Result   1. No acute fracture or malalignment  of the left femur.   2. Benign appearing lucent lesion with sclerotic rim in the medial   proximal femur likely representing low-grade fibrous lesion. Consider   repeat radiographs in 6 months to document stability.        MACRO:        None.        Signed by: Marry Young 1/7/2025 8:00 PM   Dictation workstation:   [id number]           The patient does not have any visible or palpable bony deformities on exam.  He does not have any gross motor, neurologic or vascular deficits on exam.  X-ray of the LS spine does not show any evidence of fracture or dislocation.  There is moderate degenerative disease.  The x-ray of the left femur shows no evidence of fracture or dislocation.  There is a benign-appearing lucent lesion within the sclerotic rim in the medial proximal femur that likely represents a low-grade fibrous lesion per the radiology reading.  The patient was able to complete a trial ambulation after medications that were given in the emergency department.      The patient was released in good condition in the company of his family members.  He will follow-up with his primary care physician within 1 to 2 days for further management of his current symptoms.  He was also given a referral to orthopedics for further management of the lesion seen on the x-ray.  He will return to the emergency department sooner with worsening of symptoms or onset of new symptoms.      Impression/diagnosis:  Left hip/thigh pain  Hypertension, unspecified  Sciatica, left-sided  Lesion in the proximal femur on x-ray      I personally saw the patient and made/approve the management plan and take responsibility for the patient management.      I personally discussed the patient's management with the patient      I reviewed the results of the diagnostic imaging.  Formal radiology read was completed by the radiologist.      Margy Crump MD

## 2025-01-08 NOTE — ED PROVIDER NOTES
HPI   Chief Complaint   Patient presents with    Leg Pain     Patient reports left leg pain since Saturday night, no injury, worse with activity.  Patient states pain is in the left buttock, left thigh top and bottom and knee pain.       74-year-old male presented emergency department with a chief complaint of left lower back pain rating down left lateral leg.  Symptoms been for the last several days.  He is able to bear weight and ambulate.  Denies any specific injury or trauma.  Denies bowel or bladder dysfunction, saddle anesthesia.  Not really taken anything for relief.  No other complaint.              Patient History   Past Medical History:   Diagnosis Date    Anxiety     Arthritis     BPH (benign prostatic hyperplasia)     Depression     High cholesterol     Hypertension     PTSD (post-traumatic stress disorder)     Sleep apnea      Past Surgical History:   Procedure Laterality Date    MR HEAD ANGIO WO IV CONTRAST  3/22/2022    MR HEAD ANGIO WO IV CONTRAST 3/22/2022 [facility]    OTHER SURGICAL HISTORY  09/19/2013    Ear Surgery    OTHER SURGICAL HISTORY  10/27/2021    Thyroid surgery     Family History   Problem Relation Name Age of Onset    Heart disease Other      Hypertension Other       Social History     Tobacco Use    Smoking status: Former     Types: Cigarettes    Smokeless tobacco: Never   Substance Use Topics    Alcohol use: Never    Drug use: Never       Physical Exam   ED Triage Vitals [01/07/25 1834]   Temperature Heart Rate Respirations BP   37.3 °C (99.1 °F) 95 15 172/79      Pulse Ox Temp src Heart Rate Source Patient Position   95 % -- -- --      BP Location FiO2 (%)     -- --       Physical Exam  Vitals and nursing note reviewed.   Constitutional:       Appearance: Normal appearance.   HENT:      Head: Normocephalic.      Nose: Nose normal.      Mouth/Throat:      Mouth: Mucous membranes are moist.   Cardiovascular:      Rate and Rhythm: Normal rate.      Pulses: Normal pulses.    Pulmonary:      Effort: Pulmonary effort is normal.   Abdominal:      General: Abdomen is flat.   Musculoskeletal:         General: Normal range of motion.      Cervical back: Normal range of motion.   Skin:     General: Skin is warm.   Neurological:      General: No focal deficit present.      Mental Status: He is alert and oriented to person, place, and time.   Psychiatric:         Mood and Affect: Mood normal.           ED Course & MDM   Diagnoses as of 01/07/25 2050   Acute left-sided low back pain with left-sided sciatica   Bone lesion   Degeneration of intervertebral disc of lumbar region with discogenic back pain and lower extremity pain                 No data recorded     B[place] Coma Scale Score: 15 (01/07/25 1838 : Grace Eckert RN)                           Medical Decision Making  I have seen and evaluated this patient.  The attending physician has also seen and evaluated this patient.  Vital signs, laboratory testing and diagnostic images if applicable have been reviewed.  All laboratory and imaging is interpreted by myself unless otherwise stated.  Radiology studies are also formally interpreted by radiologist.    Imaging shows likely benign lesion of femur, degenerative disc disease.  Overall impression is likely sciatica.  Started on steroid pain control medication.  Released in stable condition from emergent standpoint.    Labs Reviewed - No data to display  XR lumbar spine 2-3 views   Final Result    1. Moderate L5-S1 degenerative changes.    2. Additional findings as above.    3. 13 mm calcific density projecting in the region of the right    kidney lower pole likely representing a nephrolith.          MACRO:    None.          Signed by: Marry Young 1/7/2025 7:58 PM    Dictation workstation:   [id number]     XR femur left 2+ views   Final Result    1. No acute fracture or malalignment of the left femur.    2. Benign appearing lucent lesion with sclerotic rim in the medial    proximal  femur likely representing low-grade fibrous lesion. Consider    repeat radiographs in 6 months to document stability.          MACRO:          None.          Signed by: Marry Young 1/7/2025 8:00 PM    Dictation workstation:   [id number]     Medications  ibuprofen tablet 400 mg (400 mg oral Given 1/7/25 1845)  predniSONE (Deltasone) tablet 60 mg (60 mg oral Given 1/7/25 1845)  Discharge Medication List as of 1/7/2025  8:51 PM    START taking these medications    acetaminophen (Tylenol) 325 mg tablet  Take 2 tablets (650 mg) by mouth every 6 hours if needed for mild pain (1 - 3) for up to 10 days., Starting Tue 1/7/2025, Until Fri 1/17/2025 at 2359, Normal    lidocaine (Lidoderm) 5 % patch  Place 1 patch over 12 hours on the skin once daily for 10 doses. Remove & discard patch within 12 hours or as directed by MD., Starting Tue 1/7/2025, Until Fri 1/17/2025, Normal    methocarbamol (Robaxin) 500 mg tablet  Take 1 tablet (500 mg) by mouth 2 times a day for 10 days., Starting Tue 1/7/2025, Until Fri 1/17/2025, Normal    methylPREDNISolone (Medrol Dospak) 4 mg tablets  Follow schedule on package instructions, Normal    traMADol (Ultram) 50 mg tablet  Take 1 tablet (50 mg) by mouth every 6 hours if needed for severe pain (7 - 10) for up to 3 days., Starting Tue 1/7/2025, Until Fri 1/10/2025 at 2359, Normal                  Procedure  Procedures     Perry Glass PA-C  01/08/25 0043

## 2025-01-14 ENCOUNTER — OFFICE VISIT (OUTPATIENT)
Dept: PRIMARY CARE | Facility: CLINIC | Age: 75
End: 2025-01-14
Payer: MEDICARE

## 2025-01-14 ENCOUNTER — HOSPITAL ENCOUNTER (OUTPATIENT)
Dept: RADIOLOGY | Facility: CLINIC | Age: 75
Discharge: HOME | End: 2025-01-14
Payer: MEDICARE

## 2025-01-14 VITALS
WEIGHT: 196 LBS | BODY MASS INDEX: 29.03 KG/M2 | DIASTOLIC BLOOD PRESSURE: 82 MMHG | HEIGHT: 69 IN | SYSTOLIC BLOOD PRESSURE: 142 MMHG

## 2025-01-14 DIAGNOSIS — M79.89 LEFT LEG SWELLING: ICD-10-CM

## 2025-01-14 DIAGNOSIS — Z23 IMMUNIZATION DUE: ICD-10-CM

## 2025-01-14 DIAGNOSIS — I82.90 DEEP VEIN THROMBOSIS (DVT) OF NON-EXTREMITY VEIN, UNSPECIFIED CHRONICITY: ICD-10-CM

## 2025-01-14 DIAGNOSIS — F41.9 ANXIETY AND DEPRESSION: ICD-10-CM

## 2025-01-14 DIAGNOSIS — M25.562 LEFT KNEE PAIN, UNSPECIFIED CHRONICITY: ICD-10-CM

## 2025-01-14 DIAGNOSIS — I10 HYPERTENSION, UNSPECIFIED TYPE: ICD-10-CM

## 2025-01-14 DIAGNOSIS — M54.9 BACK PAIN, UNSPECIFIED BACK LOCATION, UNSPECIFIED BACK PAIN LATERALITY, UNSPECIFIED CHRONICITY: ICD-10-CM

## 2025-01-14 DIAGNOSIS — Z86.718 H/O DEEP VENOUS THROMBOSIS: ICD-10-CM
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
## 2025-01-14 DIAGNOSIS — F32.A ANXIETY AND DEPRESSION: ICD-10-CM

## 2025-01-14 DIAGNOSIS — E78.00 HIGH CHOLESTEROL: ICD-10-CM

## 2025-01-14 DIAGNOSIS — S80.02XA TRAUMATIC ECCHYMOSIS OF LEFT KNEE, INITIAL ENCOUNTER: Primary | ICD-10-CM

## 2025-01-14 PROBLEM — Z86.0100 HISTORY OF COLONIC POLYPS: Status: ACTIVE | Noted: 2025-01-14

## 2025-01-14 PROCEDURE — 73564 X-RAY EXAM KNEE 4 OR MORE: CPT | Mod: LT

## 2025-01-14 PROCEDURE — 3077F SYST BP >= 140 MM HG: CPT | Performed by: INTERNAL MEDICINE

## 2025-01-14 PROCEDURE — 90715 TDAP VACCINE 7 YRS/> IM: CPT | Performed by: INTERNAL MEDICINE

## 2025-01-14 PROCEDURE — 1123F ACP DISCUSS/DSCN MKR DOCD: CPT | Performed by: INTERNAL MEDICINE

## 2025-01-14 PROCEDURE — 99214 OFFICE O/P EST MOD 30 MIN: CPT | Performed by: INTERNAL MEDICINE

## 2025-01-14 PROCEDURE — 3079F DIAST BP 80-89 MM HG: CPT | Performed by: INTERNAL MEDICINE

## 2025-01-14 PROCEDURE — 73564 X-RAY EXAM KNEE 4 OR MORE: CPT | Mod: LEFT SIDE | Performed by: RADIOLOGY

## 2025-01-14 PROCEDURE — 1159F MED LIST DOCD IN RCRD: CPT | Performed by: INTERNAL MEDICINE

## 2025-01-14 PROCEDURE — 90471 IMMUNIZATION ADMIN: CPT | Performed by: INTERNAL MEDICINE

## 2025-01-14 RX ORDER — NABUMETONE 750 MG/1
750 TABLET, FILM COATED ORAL EVERY 12 HOURS
Qty: 60 TABLET | Refills: 2 | Status: SHIPPED | OUTPATIENT
Start: 2025-01-14

## 2025-01-14 RX ORDER — CYCLOBENZAPRINE HCL 10 MG
10 TABLET ORAL NIGHTLY PRN
Qty: 30 TABLET | Refills: 2 | Status: SHIPPED | OUTPATIENT
Start: 2025-01-14 | End: 2025-09-11

## 2025-01-15 NOTE — PROGRESS NOTES
"Subjective   Patient ID: Adithya Tirado is a 74 y.o. male who presents for Follow-up and Fall.    This gentleman Adithya, today came here for multiple medical issues.  1. He feel down, severe pain in knee, difficulty in walking, not feeling good.  He came for follow-up on various conditions.  2. While he was here, he fell down in the parking lot.  He had pain and bruising in the left knee.    I have personally reviewed the patient's Past Medical History, Medications, Allergies, Social History, and Family History in the EMR.    Fall    Review of Systems   All other systems reviewed and are negative.    Objective   /82   Ht 1.753 m (5' 9\")   Wt 88.9 kg (196 lb)   BMI 28.94 kg/m²     Physical Exam  Vitals reviewed.   Cardiovascular:      Heart sounds: Normal heart sounds, S1 normal and S2 normal. No murmur heard.     No friction rub.   Pulmonary:      Effort: Pulmonary effort is normal.      Breath sounds: Normal breath sounds and air entry.   Abdominal:      Palpations: There is no hepatomegaly, splenomegaly or mass.   Musculoskeletal:      Left knee: Swelling present. Tenderness present.      Right lower leg: No edema.      Left lower leg: No edema.      Comments: Left knee, some bruise present.   Lymphadenopathy:      Lower Body: No right inguinal adenopathy. No left inguinal adenopathy.   Neurological:      Cranial Nerves: Cranial nerves 2-12 are intact.      Sensory: No sensory deficit.      Motor: Motor function is intact.      Deep Tendon Reflexes: Reflexes are normal and symmetric.     LAB WORK: Laboratory testing discussed.    Assessment/Plan   Problem List Items Addressed This Visit             ICD-10-CM       Cardiac and Vasculature    Hypertension I10       Musculoskeletal and Injuries    Fall W19.XXXA    Relevant Orders    XR knee left 4+ views (Completed)    XR knee left 1-2 views     Other Visit Diagnoses         Codes    Traumatic ecchymosis of left knee, initial encounter    -  Primary S80.02XA "    Immunization due     Z23    Relevant Orders    Tdap vaccine, age 7 years and older (Completed)    Back pain, unspecified back location, unspecified back pain laterality, unspecified chronicity     M54.9    Relevant Medications    nabumetone (Relafen) 750 mg tablet    Left knee pain, unspecified chronicity     M25.562    Relevant Medications    cyclobenzaprine (Flexeril) 10 mg tablet    nabumetone (Relafen) 750 mg tablet    Other Relevant Orders    XR knee left 1-2 views    Referral to Orthopaedic Surgery    Referral to Physical Therapy    Deep vein thrombosis (DVT) of non-extremity vein, unspecified chronicity     I82.90    Relevant Orders    Vascular US Lower Extremity Venous Duplex Left    H/O deep venous thrombosis     Z86.718    Relevant Orders    Vascular US Lower Extremity Venous Duplex Left    Left leg swelling     M79.89    High cholesterol     E78.00    Anxiety and depression     F41.9, F32.A        1. Left knee bruise, abrasion.  Soak in hot water, bacitracin cream.  I ordered x-ray.  2. Left leg swelling.  Ultrasound ordered.  3. Knee pain from fall, not getting better.  Relafen, Flexeril, knee brace.  Referred to Orthopedic.  4. Hypertension, okay.  5. Cholesterol, stable.  6. Tetanus shot given today because of this open injury.  7. Wound.  Wound care explained.  8. Anxiety and depression, okay.  9. Hypertension, stable.  10. Follow-up in a couple of weeks.  11. Continue to follow.    Ericibmatthew Attestation  By signing my name below, INiecy Scribe attest that this documentation has been prepared under the direction and in the presence of Mayela Barrett MD.   All medical record entries made by the scribe were personally dictated by me I have reviewed the chart and agree the record accurately reflects my personal performance of his history physical examination and management

## 2025-01-16 ENCOUNTER — HOSPITAL ENCOUNTER (OUTPATIENT)
Dept: RADIOLOGY | Facility: CLINIC | Age: 75
Discharge: HOME | End: 2025-01-16
Payer: MEDICARE

## 2025-01-16 DIAGNOSIS — I82.90 DEEP VEIN THROMBOSIS (DVT) OF NON-EXTREMITY VEIN, UNSPECIFIED CHRONICITY: ICD-10-CM

## 2025-01-16 DIAGNOSIS — Z86.718 H/O DEEP VENOUS THROMBOSIS: ICD-10-CM

## 2025-01-16 PROCEDURE — 93971 EXTREMITY STUDY: CPT

## 2025-01-17 NOTE — PROGRESS NOTES
Subjective      Chief Complaint   Patient presents with    Left Knee - Pain     1/18/25patient states he woke up ans could not walk radiates to his foot         Past Surgical History:   Procedure Laterality Date    MR HEAD ANGIO WO IV CONTRAST  3/22/2022    MR HEAD ANGIO WO IV CONTRAST 3/22/2022 [facility]    OTHER SURGICAL HISTORY  09/19/2013    Ear Surgery    OTHER SURGICAL HISTORY  10/27/2021    Thyroid surgery        Past Medical History:   Diagnosis Date    Anxiety     Arthritis     BPH (benign prostatic hyperplasia)     Depression     High cholesterol     Hypertension     PTSD (post-traumatic stress disorder)     Sleep apnea         HPI  This 74 year old patient presents today with left knee pain 7/10. The patient states that this left knee pain has been worsening and persistent for  weeks.  The patient recently fell which aggravated his left knee pain. The patient states that the left knee pain is worse with and aggravated by prolonged walking and standing. The patient states that this left knee pain impairs their ability to complete normal activities of daily living. The patient has tried Tylenol and ibuprofen with no relief.  He is able to stand and walk independently.    No Known Allergies     Family History   Problem Relation Name Age of Onset    Heart disease Other      Hypertension Other          Social History     Socioeconomic History    Marital status: Single     Spouse name: Not on file    Number of children: Not on file    Years of education: Not on file    Highest education level: Not on file   Occupational History    Occupation: retired.   Tobacco Use    Smoking status: Former     Types: Cigarettes    Smokeless tobacco: Never   Substance and Sexual Activity    Alcohol use: Never    Drug use: Never    Sexual activity: Not on file   Other Topics Concern    Not on file   Social History Narrative    Not on file     Social Drivers of Health     Financial Resource Strain: Not on file   Food  Insecurity: Not on file   Transportation Needs: Not on file   Physical Activity: Not on file   Stress: Not on file   Social Connections: Not on file   Intimate Partner Violence: Not on file   Housing Stability: Not on file        ROS  CARDIOLOGY:   Negative for chest pain, shortness of breath.   RESPIRATORY:   Negative for chest pain, shortness of breath.   MUSCULOSKELETAL:   See HPI for details.   NEUROLOGY:   Negative for tingling, numbness, weakness.    Objective    Body mass index is 28.94 kg/m².     Physical Exam  GENERAL:          General Appearance:  This is a pleasant patient with appropriate affect, in no acute distress.   DERMATOLOGY:          Skin: skin at the neck, upper and lower back, and trunk is intact. There is no evidence of skin rash, skin breakdown or ulceration, or atrophic skin change.   EXTREMITIES:          Vascular:  Right, left hands and feet are warm with good color and pulses. Right and left calf and thigh are nontender and nonswollen.   NEUROLOGICAL:          Orientation:  Patient is alert and oriented to person, place, time and situation. Right and left upper and lower extremity motor and sensory examinations are intact.  MUSCULOSKELETAL: Neck: No tenderness. No pain or limitation with range of motion. Back: No tenderness. Straight leg test negative bilaterally. Right and left hips: No tenderness over the right or left greater trochanter. Right and left lower extremity in good position. Right knee: Nontender. No pain with gentle ROM. left knee: There is diffuse tenderness over the knee. Clean dry abrasion. The patient has ROM from 5-120.  McMurrays is negative. Anterior drawer and lachmans are negative. There is not an effusion present. The patient is able to extend the left knee vs gravity and actively straight leg raise with the left knee.  He is able to get up on the examining table putting his left foot on the stool and pushing off on his left knee.. The left knee is stable to  valgus and varus stressing. Nontender in the left calf. Compartments are soft. Neurovascular is intact to light touch. The patient walks with a painful gait favoring the left  knee while walking.    Vascular US Lower Extremity Venous Duplex Left    Result Date: 1/16/2025  Interpreted By:  Zack Joseph, STUDY: [facility] US LOWER EXTREMITY VENOUS DUPLEX LEFT;  1/16/2025 1:45 pm   INDICATION: Signs/Symptoms:DVT.   ,I82.90 Acute embolism and thrombosis of unspecified vein,Z86.718 Personal history of other venous thrombosis and embolism   COMPARISON: None.   ACCESSION NUMBER(S): [id number]   ORDERING CLINICIAN: LYNN ARIZA   TECHNIQUE: Vascular ultrasound of the  left lower extremity was performed. Real-time compression views as well as Gray scale, color Doppler and spectral Doppler waveform analysis was performed.   FINDINGS: Evaluation of the visualized portions of the common femoral vein, proximal, mid, and distal femoral vein, and popliteal vein were performed.  Evaluation of the visualized portions of the calf veins was also performed.   The evaluated veins demonstrate normal compressibility. There is intact venous flow demonstrating normal respiratory variability and normal augmentation of flow with calf compression. Therefore, there is no ultrasonographic evidence for deep vein thrombosis within the evaluated veins.       No sonographic evidence for deep vein thrombosis within the evaluated veins.   MACRO: None.   Signed by: Zack Joseph 1/16/2025 2:04 PM Dictation workstation:   [id number]    XR knee left 4+ views    Result Date: 1/14/2025  Interpreted By:  Ruperto Joel, STUDY: XR KNEE LEFT 4+ VIEWS   INDICATION: Signs/Symptoms:fall.   COMPARISON: None   ACCESSION NUMBER(S): [id number]   ORDERING CLINICIAN: LYNN ARIZA   FINDINGS: Four views right knee.   Equivocal nondisplaced transverse mid pole patellar fracture.   Correlate with physical exam findings.   Alignment normal.       Equivocal nondisplaced  fracture left patella.   Signed by: Ruperto Joel 1/14/2025 5:38 PM Dictation workstation:   [id number]    XR femur left 2+ views    Result Date: 1/7/2025  Interpreted By:  Marry Young, STUDY: Left femur, two views.   INDICATION: Signs/Symptoms:Acute on chronic pain.   COMPARISON: None.   ACCESSION NUMBER(S): [id number]   ORDERING CLINICIAN: CYNTHIA CUELLAR   FINDINGS: No acute fracture or malalignment. There is a benign appearing lucent lesion with sclerotic rim in the medial aspect of the proximal femur measuring 1.8 cm in craniocaudal dimension. Left hip and knee joints are unremarkable. Soft tissues are within normal limits.       1. No acute fracture or malalignment of the left femur. 2. Benign appearing lucent lesion with sclerotic rim in the medial proximal femur likely representing low-grade fibrous lesion. Consider repeat radiographs in 6 months to document stability.   MACRO:   None.   Signed by: Maryr Young 1/7/2025 8:00 PM Dictation workstation:   [id number]    XR lumbar spine 2-3 views    Result Date: 1/7/2025  Interpreted By:  Marry Young, STUDY: Lumbar spine, three views.   INDICATION: Signs/Symptoms:Acute on chronic pain.   COMPARISON: None.   ACCESSION NUMBER(S): [id number]   ORDERING CLINICIAN: CYNTHIA CUELLAR   FINDINGS: Advanced grade 1 L4-5 anterolisthesis. 13 mm calcific density projecting in the region of the right kidney lower pole likely representing a nephrolith. Moderate discogenic degenerative changes and facet joint arthropathy at L5-S1. Mild degenerative changes of the rest of the levels. Chronic mild compression deformity of T12.   Lumbar vertebral body heights are preserved. Posterior elements are intact.       1. Moderate L5-S1 degenerative changes. 2. Additional findings as above. 3. 13 mm calcific density projecting in the region of the right kidney lower pole likely representing a nephrolith.   MACRO: None.   Signed by: Marry Young 1/7/2025 7:58 PM Dictation  workstation:   [id number]  I reviewed all of the x-rays and imaging studies with the patient above in the office today.  I also reviewed x-ray of the left femur done on 1- with the patient in the office today.  This showed a benign appearing lucent lesion with sclerotic rim at the medial proximal femur likely representing low-grade fibrous lesion.  Recommendation is to consider repeat x-rays in 6 months.    Adithya was seen today for pain.  Diagnoses and all orders for this visit:  Left knee pain, unspecified chronicity (Primary)  -     Referral to Orthopaedic Surgery  Knee strain, left, initial encounter  Other fracture of left patella, initial encounter for closed fracture  Contusion of left knee, initial encounter     Options are discussed with the patient in detail. The patient is instructed regarding activity modification and risk for further injury with falling or trauma, ice, provider directed at home gentle strengthening and ROM exercises, and the appropriate use of Tylenol as needed for pain with its potential adverse reactions and side effects. The patient understands. Follow up in 6 months for repeat x-ray (after visit summary with this recommended appointment in 6 months is given to the patient and circled) or sooner as needed. Please note that this report has been produced using speech recognition software.  It may contain errors related to grammar, punctuation or spelling.  Electronically signed, but not reviewed.     Caio Raya MD

## 2025-01-20 ENCOUNTER — OFFICE VISIT (OUTPATIENT)
Dept: ORTHOPEDIC SURGERY | Facility: CLINIC | Age: 75
End: 2025-01-20
Payer: MEDICARE

## 2025-01-20 ENCOUNTER — APPOINTMENT (OUTPATIENT)
Dept: PRIMARY CARE | Facility: CLINIC | Age: 75
End: 2025-01-20
Payer: MEDICARE

## 2025-01-20 VITALS — WEIGHT: 196 LBS | HEIGHT: 69 IN | BODY MASS INDEX: 29.03 KG/M2

## 2025-01-20 DIAGNOSIS — S86.912A KNEE STRAIN, LEFT, INITIAL ENCOUNTER: ICD-10-CM

## 2025-01-20 DIAGNOSIS — M25.562 LEFT KNEE PAIN, UNSPECIFIED CHRONICITY: Primary | ICD-10-CM

## 2025-01-20 DIAGNOSIS — S80.02XA CONTUSION OF LEFT KNEE, INITIAL ENCOUNTER: ICD-10-CM

## 2025-01-20 DIAGNOSIS — S82.092A OTHER FRACTURE OF LEFT PATELLA, INITIAL ENCOUNTER FOR CLOSED FRACTURE: ICD-10-CM

## 2025-01-20 PROCEDURE — 1036F TOBACCO NON-USER: CPT | Performed by: ORTHOPAEDIC SURGERY

## 2025-01-20 PROCEDURE — 99203 OFFICE O/P NEW LOW 30 MIN: CPT | Performed by: ORTHOPAEDIC SURGERY

## 2025-01-20 PROCEDURE — 99213 OFFICE O/P EST LOW 20 MIN: CPT | Performed by: ORTHOPAEDIC SURGERY

## 2025-01-20 PROCEDURE — 1160F RVW MEDS BY RX/DR IN RCRD: CPT | Performed by: ORTHOPAEDIC SURGERY

## 2025-01-20 PROCEDURE — 1123F ACP DISCUSS/DSCN MKR DOCD: CPT | Performed by: ORTHOPAEDIC SURGERY

## 2025-01-20 PROCEDURE — 1159F MED LIST DOCD IN RCRD: CPT | Performed by: ORTHOPAEDIC SURGERY

## 2025-01-20 ASSESSMENT — ENCOUNTER SYMPTOMS
OCCASIONAL FEELINGS OF UNSTEADINESS: 1
DEPRESSION: 0
LOSS OF SENSATION IN FEET: 0

## 2025-01-20 ASSESSMENT — PAIN SCALES - GENERAL: PAINLEVEL_OUTOF10: 5 - MODERATE PAIN

## 2025-01-20 ASSESSMENT — PAIN - FUNCTIONAL ASSESSMENT: PAIN_FUNCTIONAL_ASSESSMENT: 0-10

## 2025-01-20 NOTE — PATIENT INSTRUCTIONS
Thank you for coming to see us today!     Continue to use tylenol for pain control.   Rest, ice and elevate and remember to do exercises.     Follow up in 6 months in our office (call [phone]) for re x ray of left femur or sooner as needed

## 2025-01-30 ENCOUNTER — TELEPHONE (OUTPATIENT)
Dept: PHARMACY | Facility: HOSPITAL | Age: 75
End: 2025-01-30
Payer: MEDICARE

## 2025-01-30 NOTE — TELEPHONE ENCOUNTER
Population Health: Outreach by Ambulatory Pharmacy Team    Patient: Adithya Tirado  Primary Care Provider (PCP): Mayela Barrett MD  Payor: Malathi CROWELL  Reason: Adherence  Medication(s): rosuvastatin 40mg tablet  Outcome: Left Voicemail    [facility]

## 2025-02-04 ENCOUNTER — OFFICE VISIT (OUTPATIENT)
Dept: CARDIOLOGY | Facility: CLINIC | Age: 75
End: 2025-02-04
Payer: MEDICARE

## 2025-02-04 VITALS
HEART RATE: 67 BPM | DIASTOLIC BLOOD PRESSURE: 77 MMHG | WEIGHT: 195.3 LBS | SYSTOLIC BLOOD PRESSURE: 149 MMHG | BODY MASS INDEX: 28.93 KG/M2 | HEIGHT: 69 IN | OXYGEN SATURATION: 98 %

## 2025-02-04 DIAGNOSIS — R94.31 ABNORMAL ELECTROCARDIOGRAM (ECG) (EKG): ICD-10-CM

## 2025-02-04 DIAGNOSIS — I77.819 AORTIC DILATATION (CMS-HCC): Primary | ICD-10-CM

## 2025-02-04 PROCEDURE — 1036F TOBACCO NON-USER: CPT | Performed by: NURSE PRACTITIONER

## 2025-02-04 PROCEDURE — 1126F AMNT PAIN NOTED NONE PRSNT: CPT | Performed by: NURSE PRACTITIONER

## 2025-02-04 PROCEDURE — 99214 OFFICE O/P EST MOD 30 MIN: CPT | Performed by: NURSE PRACTITIONER

## 2025-02-04 PROCEDURE — 3078F DIAST BP <80 MM HG: CPT | Performed by: NURSE PRACTITIONER

## 2025-02-04 PROCEDURE — 3077F SYST BP >= 140 MM HG: CPT | Performed by: NURSE PRACTITIONER

## 2025-02-04 PROCEDURE — G2211 COMPLEX E/M VISIT ADD ON: HCPCS | Performed by: NURSE PRACTITIONER

## 2025-02-04 PROCEDURE — 1159F MED LIST DOCD IN RCRD: CPT | Performed by: NURSE PRACTITIONER

## 2025-02-04 PROCEDURE — 1160F RVW MEDS BY RX/DR IN RCRD: CPT | Performed by: NURSE PRACTITIONER

## 2025-02-04 ASSESSMENT — ENCOUNTER SYMPTOMS
NEUROLOGICAL NEGATIVE: 1
MUSCULOSKELETAL NEGATIVE: 1
GASTROINTESTINAL NEGATIVE: 1
CARDIOVASCULAR NEGATIVE: 1
CONSTITUTIONAL NEGATIVE: 1
RESPIRATORY NEGATIVE: 1

## 2025-02-04 ASSESSMENT — PAIN SCALES - GENERAL: PAINLEVEL_OUTOF10: 0-NO PAIN

## 2025-02-04 NOTE — PROGRESS NOTES
"Chief Complaint:   Follow-up    History Of Present Illness:    .Mr Tirado returns in follow up.  Denies chest pain, sob, palpitations or pedal edema.  Labs per pcp.              Last Recorded Vitals:  Blood pressure 149/77, pulse 67, height 1.753 m (5' 9\"), weight 88.6 kg (195 lb 4.8 oz), SpO2 98%.     Past Medical History:  Past Medical History:   Diagnosis Date    Anxiety     Arthritis     BPH (benign prostatic hyperplasia)     Depression     High cholesterol     Hypertension     PTSD (post-traumatic stress disorder)     Sleep apnea         Past Surgical History:  Past Surgical History:   Procedure Laterality Date    MR HEAD ANGIO WO IV CONTRAST  3/22/2022    MR HEAD ANGIO WO IV CONTRAST 3/22/2022 [facility]    OTHER SURGICAL HISTORY  09/19/2013    Ear Surgery    OTHER SURGICAL HISTORY  10/27/2021    Thyroid surgery       Social History:  Social History     Socioeconomic History    Marital status: Single   Occupational History    Occupation: retired.   Tobacco Use    Smoking status: Former     Types: Cigarettes    Smokeless tobacco: Never   Substance and Sexual Activity    Alcohol use: Never    Drug use: Never       Family History:  Family History   Problem Relation Name Age of Onset    Heart disease Other      Hypertension Other           Allergies:  Patient has no known allergies.    Outpatient Medications:  Current Outpatient Medications   Medication Sig Dispense Refill    amLODIPine (Norvasc) 5 mg tablet TAKE 1 TABLET BY MOUTH EVERY DAY 90 tablet 1    cyclobenzaprine (Flexeril) 10 mg tablet Take 1 tablet (10 mg) by mouth as needed at bedtime for muscle spasms. 30 tablet 2    escitalopram (Lexapro) 20 mg tablet TAKE 1 TABLET BY MOUTH EVERY DAY 90 tablet 0    haloperidol (Haldol) 0.5 mg tablet TAKE 1 TABLET (0.5 MG) BY MOUTH 2 TIMES A DAY. 180 tablet 0    ibuprofen 600 mg tablet Take 1 tablet (600 mg) by mouth 3 times a day as needed.      Klor-Con M20 20 mEq ER tablet TAKE 1 TABLET (20 MEQ) BY MOUTH " ONCE DAILY. DO NOT CRUSH OR CHEW. 90 tablet 1    melatonin 10 mg capsule Take by mouth.      methocarbamol (Robaxin) 500 mg tablet Take 1 tablet (500 mg) by mouth 2 times a day for 10 days. 20 tablet 0    metoprolol succinate XL (Toprol-XL) 50 mg 24 hr tablet TAKE 1 TABLET DAILY 90 tablet 3    nabumetone (Relafen) 750 mg tablet Take 1 tablet (750 mg) by mouth every 12 hours. 60 tablet 2    olmesartan (BENIcar) 40 mg tablet TAKE 1 TABLET BY MOUTH EVERY DAY 90 tablet 1    omeprazole (PriLOSEC) 40 mg DR capsule Take 1 capsule (40 mg) by mouth once daily in the morning. Take before meals. Do not crush or chew. 90 capsule 0    rosuvastatin (Crestor) 40 mg tablet TAKE 1 TABLET DAILY 90 tablet 3     No current facility-administered medications for this visit.        Physical Exam:  Cardiovascular:      PMI at left midclavicular line. Normal rate. Regular rhythm. Normal S1. Normal S2.       Murmurs: There is no murmur.      No gallop.  No click. No rub.   Pulses:     Intact distal pulses.   Edema:     Peripheral edema absent.         ROS:  Review of Systems   Constitutional: Negative.   Cardiovascular: Negative.    Respiratory: Negative.     Skin: Negative.    Musculoskeletal: Negative.    Gastrointestinal: Negative.    Genitourinary: Negative.    Neurological: Negative.           Last Labs:  CBC -  Lab Results   Component Value Date    WBC 7.3 07/16/2024    HGB 15.8 07/16/2024    HCT 46.9 07/16/2024    MCV 93 07/16/2024     07/16/2024       CMP -  Lab Results   Component Value Date    CALCIUM 9.5 07/16/2024    PROT 6.5 07/16/2024    ALBUMIN 4.1 07/16/2024    AST 13 07/16/2024    ALT 9 (L) 07/16/2024    ALKPHOS 50 07/16/2024    BILITOT 1.4 (H) 07/16/2024       LIPID PANEL -   Lab Results   Component Value Date    CHOL 148 07/16/2024    TRIG 233 (H) 07/16/2024    HDL 35.5 07/16/2024    CHHDL 4.2 07/16/2024    LDLF 88 06/08/2023    VLDL 47 (H) 07/16/2024    NHDL 113 07/16/2024       RENAL FUNCTION PANEL -   Lab Results    Component Value Date    GLUCOSE 96 07/16/2024     07/16/2024    K 3.9 07/16/2024     (H) 07/16/2024    CO2 25 07/16/2024    ANIONGAP 13 07/16/2024    BUN 17 07/16/2024    CREATININE 1.09 07/16/2024    GFRMALE 79 06/08/2023    CALCIUM 9.5 07/16/2024    ALBUMIN 4.1 07/16/2024        Lab Results   Component Value Date    BNP 55 12/15/2023         Assessment/Plan   Problem List Items Addressed This Visit    None     Hypertension.  Will omit hydrochlorothiazide from the olmesartan.  The patient's HCTZ as noted was discontinued last visit.  Patient is currently on olmesartan 40 mg daily only.  Repeat lab work from 12/28/2023 included an improvement in the creatinine to 1.19 with a normal CBC.  Somewhat unexpectedly potassium remains only 3.2.  He will remain on a potassium supplement.  Blood pressure is slightly elevated today however and will begin amlodipine 5 mg daily.  Syncope.      COTY.  Cr lucia to 1.57 during hospital stay 12/2023. The patient was given IV fluids.  CT shows no acute intracranial process. Cr did improve and will recheck today.  As noted above the patient's creatinine improved to 1.19 on 12/28/2023 with the discontinuance of HCTZ.  Hyperlipidemia.  Patient's lipid panel 12/28/2023 included cholesterol 157 LDL 64 HDL 36 triglyceride 285.  Continue the rosuvastatin 10 mg daily.  5.  CAD.  Coronary artery calcium score was 87.23 when done 12/2023.  Stress echo 07/2020  Summary:   1. No clinical, echocardiographic or electrocardiographic evidence for ischemia at a maximal workload.   2. No increased arrhythmias note with exercise .   3. The adequate level of stress was achieved.   4. The resting ejection fraction was estimated at 60 to 65% with a peak exercise ejection fraction estimated at 70 to 75%.  The patient remains asymptomatic without any concerning chest discomfort.   6. LES. On cpap with oxygen.         Xiomara Lainez, APRN-CNP

## 2025-02-18 ENCOUNTER — TELEPHONE (OUTPATIENT)
Dept: CARDIOLOGY | Facility: CLINIC | Age: 75
End: 2025-02-18
Payer: MEDICARE

## 2025-03-08 ENCOUNTER — TELEPHONE (OUTPATIENT)
Dept: PRIMARY CARE | Facility: CLINIC | Age: 75
End: 2025-03-08
Payer: MEDICARE

## 2025-03-08 NOTE — TELEPHONE ENCOUNTER
----- Message from Mayela Barrett sent at 1/14/2025  6:21 PM EST -----  Patient [facility] to [facility] a follow up appt. [facility]

## 2025-05-01 DIAGNOSIS — I10 HYPERTENSION, UNSPECIFIED TYPE: ICD-10-CM

## 2025-05-01 RX ORDER — AMLODIPINE BESYLATE 5 MG/1
5 TABLET ORAL DAILY
Qty: 90 TABLET | Refills: 1 | Status: SHIPPED | OUTPATIENT
Start: 2025-05-01

## 2025-06-04 ENCOUNTER — TELEPHONE (OUTPATIENT)
Dept: PRIMARY CARE | Facility: CLINIC | Age: 75
End: 2025-06-04
Payer: MEDICARE

## 2025-07-31 ENCOUNTER — OFFICE VISIT (OUTPATIENT)
Dept: PRIMARY CARE | Facility: CLINIC | Age: 75
End: 2025-07-31
Payer: MEDICARE

## 2025-07-31 VITALS
SYSTOLIC BLOOD PRESSURE: 140 MMHG | HEIGHT: 69 IN | WEIGHT: 194.6 LBS | BODY MASS INDEX: 28.82 KG/M2 | DIASTOLIC BLOOD PRESSURE: 72 MMHG

## 2025-07-31 DIAGNOSIS — I10 HYPERTENSION, UNSPECIFIED TYPE: ICD-10-CM

## 2025-07-31 DIAGNOSIS — F20.0 PARANOID SCHIZOPHRENIA (MULTI): ICD-10-CM

## 2025-07-31 DIAGNOSIS — E03.9 HYPOTHYROIDISM, UNSPECIFIED TYPE: ICD-10-CM

## 2025-07-31 DIAGNOSIS — M25.562 LEFT KNEE PAIN, UNSPECIFIED CHRONICITY: ICD-10-CM

## 2025-07-31 DIAGNOSIS — Z12.5 ENCOUNTER FOR PROSTATE CANCER SCREENING: ICD-10-CM

## 2025-07-31 DIAGNOSIS — R82.71 BACTERIURIA: ICD-10-CM

## 2025-07-31 DIAGNOSIS — F20.89 OTHER SCHIZOPHRENIA: ICD-10-CM

## 2025-07-31 DIAGNOSIS — R73.03 PRE-DIABETES: ICD-10-CM

## 2025-07-31 DIAGNOSIS — E78.00 HIGH CHOLESTEROL: ICD-10-CM

## 2025-07-31 PROCEDURE — 99214 OFFICE O/P EST MOD 30 MIN: CPT | Performed by: INTERNAL MEDICINE

## 2025-07-31 PROCEDURE — 3077F SYST BP >= 140 MM HG: CPT | Performed by: INTERNAL MEDICINE

## 2025-07-31 PROCEDURE — 3078F DIAST BP <80 MM HG: CPT | Performed by: INTERNAL MEDICINE

## 2025-07-31 PROCEDURE — 1159F MED LIST DOCD IN RCRD: CPT | Performed by: INTERNAL MEDICINE

## 2025-08-01 ENCOUNTER — RESULTS FOLLOW-UP (OUTPATIENT)
Dept: PRIMARY CARE | Facility: CLINIC | Age: 75
End: 2025-08-01
Payer: MEDICARE

## 2025-08-01 DIAGNOSIS — I10 HYPERTENSION, UNSPECIFIED TYPE: ICD-10-CM

## 2025-08-01 LAB
ALBUMIN SERPL-MCNC: 4.3 G/DL (ref 3.6–5.1)
ALP SERPL-CCNC: 47 U/L (ref 35–144)
ALT SERPL-CCNC: 20 U/L (ref 9–46)
ANION GAP SERPL CALCULATED.4IONS-SCNC: 7 MMOL/L (CALC) (ref 7–17)
APPEARANCE UR: ABNORMAL
AST SERPL-CCNC: 21 U/L (ref 10–35)
BACTERIA #/AREA URNS HPF: ABNORMAL /HPF
BILIRUB SERPL-MCNC: 1.2 MG/DL (ref 0.2–1.2)
BILIRUB UR QL STRIP: NEGATIVE
BUN SERPL-MCNC: 18 MG/DL (ref 7–25)
CALCIUM SERPL-MCNC: 9.4 MG/DL (ref 8.6–10.3)
CAOX CRY #/AREA URNS HPF: ABNORMAL /HPF
CHLORIDE SERPL-SCNC: 107 MMOL/L (ref 98–110)
CO2 SERPL-SCNC: 25 MMOL/L (ref 20–32)
COLOR UR: ABNORMAL
CREAT SERPL-MCNC: 1.09 MG/DL (ref 0.7–1.28)
EGFRCR SERPLBLD CKD-EPI 2021: 70 ML/MIN/1.73M2
ERYTHROCYTE [DISTWIDTH] IN BLOOD BY AUTOMATED COUNT: 13.3 % (ref 11–15)
GLUCOSE SERPL-MCNC: 148 MG/DL (ref 65–99)
GLUCOSE UR QL STRIP: NEGATIVE
HCT VFR BLD AUTO: 50.2 % (ref 38.5–50)
HGB BLD-MCNC: 16.7 G/DL (ref 13.2–17.1)
HGB UR QL STRIP: NEGATIVE
HYALINE CASTS #/AREA URNS LPF: ABNORMAL /LPF
KETONES UR QL STRIP: NEGATIVE
LEUKOCYTE ESTERASE UR QL STRIP: NEGATIVE
MCH RBC QN AUTO: 31.5 PG (ref 27–33)
MCHC RBC AUTO-ENTMCNC: 33.3 G/DL (ref 32–36)
MCV RBC AUTO: 94.7 FL (ref 80–100)
NITRITE UR QL STRIP: NEGATIVE
PH UR STRIP: ABNORMAL [PH] (ref 5–8)
PLATELET # BLD AUTO: 220 THOUSAND/UL (ref 140–400)
PMV BLD REES-ECKER: 9.6 FL (ref 7.5–12.5)
POTASSIUM SERPL-SCNC: 4 MMOL/L (ref 3.5–5.3)
PROT SERPL-MCNC: 6.6 G/DL (ref 6.1–8.1)
PROT UR QL STRIP: ABNORMAL
RBC # BLD AUTO: 5.3 MILLION/UL (ref 4.2–5.8)
RBC #/AREA URNS HPF: ABNORMAL /HPF
SERVICE CMNT-IMP: ABNORMAL
SODIUM SERPL-SCNC: 139 MMOL/L (ref 135–146)
SP GR UR STRIP: 1.02 (ref 1–1.03)
SQUAMOUS #/AREA URNS HPF: ABNORMAL /HPF
TSH SERPL-ACNC: 1.84 MIU/L (ref 0.4–4.5)
WBC # BLD AUTO: 7.9 THOUSAND/UL (ref 3.8–10.8)
WBC #/AREA URNS HPF: ABNORMAL /HPF

## 2025-08-01 RX ORDER — OLMESARTAN MEDOXOMIL 40 MG/1
40 TABLET ORAL DAILY
Qty: 90 TABLET | Refills: 1 | Status: SHIPPED | OUTPATIENT
Start: 2025-08-01

## 2025-08-01 RX ORDER — POTASSIUM CHLORIDE 20 MEQ/1
20 TABLET, EXTENDED RELEASE ORAL DAILY
Qty: 90 TABLET | Refills: 1 | Status: SHIPPED | OUTPATIENT
Start: 2025-08-01

## 2025-08-01 NOTE — PROGRESS NOTES
"Subjective   Patient ID: Adithya Tirado is a 75 y.o. male who presents for Follow-up.    Mr. Tirado today came here for multiple medical issues.  Left knee pain, bone pain.  He has a spot on the femur.  He wants to get checked out.  Blood pressure okay.  He came for follow-up.    I have personally reviewed the patient's Past Medical History, Medications, Allergies, Social History, and Family History in the EMR.    Review of Systems   All other systems reviewed and are negative.    Objective   /72   Ht 1.753 m (5' 9\")   Wt 88.3 kg (194 lb 9.6 oz)   BMI 28.74 kg/m²     Physical Exam  Vitals reviewed.     Cardiovascular:      Heart sounds: Normal heart sounds, S1 normal and S2 normal. No murmur heard.     No friction rub.   Pulmonary:      Effort: Pulmonary effort is normal.      Breath sounds: Normal breath sounds and air entry.   Abdominal:      Palpations: There is no hepatomegaly, splenomegaly or mass.     Musculoskeletal:      Right lower leg: No edema.      Left lower leg: No edema.      Comments: Left femur and knee swelling and tenderness.  Movements painful.   Lymphadenopathy:      Lower Body: No right inguinal adenopathy. No left inguinal adenopathy.     Neurological:      Cranial Nerves: Cranial nerves 2-12 are intact.      Sensory: No sensory deficit.      Motor: Motor function is intact.      Deep Tendon Reflexes: Reflexes are normal and symmetric.     LAB WORK: Laboratory testing discussed.    Assessment/Plan   Problem List Items Addressed This Visit           ICD-10-CM    Hypertension I10    Relevant Orders    CBC    Urinalysis with Reflex Culture and Microscopic    Left knee pain M25.562    Relevant Orders    XR knee left 4+ views    XR femur left 2+ views     Other Visit Diagnoses         Codes      High cholesterol     E78.00    Relevant Orders    Comprehensive Metabolic Panel    Lipid Panel      Hypothyroidism, unspecified type     E03.9    Relevant Orders    Thyroid Stimulating Hormone    "   Pre-diabetes     R73.03    Relevant Orders    Hemoglobin A1C      Encounter for prostate cancer screening     Z12.5    Relevant Orders    Prostate Specific Antigen, Screen      Bacteriuria     R82.71    Relevant Orders    Urinalysis with Reflex Culture and Microscopic        1. Left femur and knee pain.  I ordered x-rays, Relafen, Flexeril.  Monitor.  2. Hypertension.  Kidney okay.  3. Cholesterol.  Monitor.  4. Prostate health.  PSA.  5. I urged him to see me in a week after the testing.    Monica Attestation  By signing my name below, I, Monica Jones attest that this documentation has been prepared under the direction and in the presence of Mayela Barrett MD.     All medical record entries made by the monica were personally dictated by me I have reviewed the chart and agree the record accurately reflects my personal performance of his history physical examination and management

## 2025-08-07 NOTE — TELEPHONE ENCOUNTER
----- Message from Mayela Barrett sent at 8/1/2025  5:13 PM EDT -----  Patient needs to make a follow up appt.  ----- Message -----  From: Abel Mejia Results In  Sent: 8/1/2025   6:54 AM EDT  To: Mayela Barrett MD

## 2025-08-20 ENCOUNTER — TELEPHONE (OUTPATIENT)
Dept: GASTROENTEROLOGY | Facility: CLINIC | Age: 75
End: 2025-08-20
Payer: MEDICARE

## 2025-08-21 DIAGNOSIS — Z86.0100 HISTORY OF COLONIC POLYPS: ICD-10-CM

## 2025-08-21 RX ORDER — POLYETHYLENE GLYCOL 3350, SODIUM SULFATE ANHYDROUS, SODIUM BICARBONATE, SODIUM CHLORIDE, POTASSIUM CHLORIDE 236; 22.74; 6.74; 5.86; 2.97 G/4L; G/4L; G/4L; G/4L; G/4L
POWDER, FOR SOLUTION ORAL
Qty: 4000 ML | Refills: 0 | Status: SHIPPED | OUTPATIENT
Start: 2025-08-21

## 2025-08-27 ENCOUNTER — APPOINTMENT (OUTPATIENT)
Dept: PRIMARY CARE | Facility: CLINIC | Age: 75
End: 2025-08-27
Payer: MEDICARE

## 2025-08-27 ENCOUNTER — HOSPITAL ENCOUNTER (OUTPATIENT)
Dept: RADIOLOGY | Facility: CLINIC | Age: 75
Discharge: HOME | End: 2025-08-27
Payer: MEDICARE

## 2025-08-27 VITALS
DIASTOLIC BLOOD PRESSURE: 72 MMHG | HEIGHT: 69 IN | SYSTOLIC BLOOD PRESSURE: 138 MMHG | WEIGHT: 190.6 LBS | BODY MASS INDEX: 28.23 KG/M2

## 2025-08-27 DIAGNOSIS — M25.562 LEFT KNEE PAIN, UNSPECIFIED CHRONICITY: ICD-10-CM

## 2025-08-27 DIAGNOSIS — I10 HYPERTENSION, UNSPECIFIED TYPE: Primary | ICD-10-CM

## 2025-08-27 DIAGNOSIS — Z12.5 PROSTATE CANCER SCREENING: ICD-10-CM

## 2025-08-27 DIAGNOSIS — R73.03 PRE-DIABETES: ICD-10-CM

## 2025-08-27 DIAGNOSIS — S82.002A CLOSED NONDISPLACED FRACTURE OF LEFT PATELLA, UNSPECIFIED FRACTURE MORPHOLOGY, INITIAL ENCOUNTER: ICD-10-CM

## 2025-08-27 DIAGNOSIS — K21.9 GASTROESOPHAGEAL REFLUX DISEASE, UNSPECIFIED WHETHER ESOPHAGITIS PRESENT: ICD-10-CM

## 2025-08-27 DIAGNOSIS — E78.00 HIGH CHOLESTEROL: ICD-10-CM

## 2025-08-27 DIAGNOSIS — M19.90 ARTHRITIS PAIN: ICD-10-CM

## 2025-08-27 PROCEDURE — 3075F SYST BP GE 130 - 139MM HG: CPT | Performed by: INTERNAL MEDICINE

## 2025-08-27 PROCEDURE — 1159F MED LIST DOCD IN RCRD: CPT | Performed by: INTERNAL MEDICINE

## 2025-08-27 PROCEDURE — G2211 COMPLEX E/M VISIT ADD ON: HCPCS | Performed by: INTERNAL MEDICINE

## 2025-08-27 PROCEDURE — 73552 X-RAY EXAM OF FEMUR 2/>: CPT | Mod: LEFT SIDE | Performed by: STUDENT IN AN ORGANIZED HEALTH CARE EDUCATION/TRAINING PROGRAM

## 2025-08-27 PROCEDURE — 73560 X-RAY EXAM OF KNEE 1 OR 2: CPT | Mod: LEFT SIDE | Performed by: STUDENT IN AN ORGANIZED HEALTH CARE EDUCATION/TRAINING PROGRAM

## 2025-08-27 PROCEDURE — 73552 X-RAY EXAM OF FEMUR 2/>: CPT | Mod: LT

## 2025-08-27 PROCEDURE — 73560 X-RAY EXAM OF KNEE 1 OR 2: CPT | Mod: LT

## 2025-08-27 PROCEDURE — 3078F DIAST BP <80 MM HG: CPT | Performed by: INTERNAL MEDICINE

## 2025-08-27 PROCEDURE — 99214 OFFICE O/P EST MOD 30 MIN: CPT | Performed by: INTERNAL MEDICINE

## 2025-08-27 ASSESSMENT — PATIENT HEALTH QUESTIONNAIRE - PHQ9
SUM OF ALL RESPONSES TO PHQ9 QUESTIONS 1 AND 2: 1
1. LITTLE INTEREST OR PLEASURE IN DOING THINGS: NOT AT ALL
2. FEELING DOWN, DEPRESSED OR HOPELESS: SEVERAL DAYS

## 2025-08-28 ENCOUNTER — APPOINTMENT (OUTPATIENT)
Dept: GASTROENTEROLOGY | Facility: EXTERNAL LOCATION | Age: 75
End: 2025-08-28
Payer: MEDICARE

## 2025-09-25 ENCOUNTER — APPOINTMENT (OUTPATIENT)
Dept: GASTROENTEROLOGY | Facility: EXTERNAL LOCATION | Age: 75
End: 2025-09-25
Payer: MEDICARE